# Patient Record
Sex: MALE | Race: WHITE | NOT HISPANIC OR LATINO | Employment: FULL TIME | URBAN - METROPOLITAN AREA
[De-identification: names, ages, dates, MRNs, and addresses within clinical notes are randomized per-mention and may not be internally consistent; named-entity substitution may affect disease eponyms.]

---

## 2023-08-04 DIAGNOSIS — I10 HYPERTENSION, UNSPECIFIED TYPE: ICD-10-CM

## 2023-08-04 RX ORDER — LOSARTAN POTASSIUM 50 MG/1
50 TABLET ORAL DAILY
COMMUNITY
End: 2023-08-04 | Stop reason: SDUPTHER

## 2023-08-04 RX ORDER — LOSARTAN POTASSIUM 50 MG/1
50 TABLET ORAL DAILY
Qty: 90 TABLET | Refills: 1 | Status: SHIPPED | OUTPATIENT
Start: 2023-08-04 | End: 2024-01-25 | Stop reason: SDUPTHER

## 2023-09-22 DIAGNOSIS — Z00.00 HEALTH MAINTENANCE EXAMINATION: ICD-10-CM

## 2023-09-25 DIAGNOSIS — Z00.00 HEALTH MAINTENANCE EXAMINATION: ICD-10-CM

## 2023-10-30 PROBLEM — N52.9 ERECTILE DYSFUNCTION: Status: ACTIVE | Noted: 2023-10-30

## 2023-10-30 PROBLEM — E78.5 HYPERLIPIDEMIA: Status: ACTIVE | Noted: 2023-10-30

## 2023-10-30 PROBLEM — H69.90 EUSTACHIAN TUBE DISORDER: Status: ACTIVE | Noted: 2023-10-30

## 2023-10-30 PROBLEM — M17.9 OSTEOARTHRITIS OF KNEE: Status: ACTIVE | Noted: 2023-10-30

## 2023-10-30 PROBLEM — R94.39 ABNORMAL STRESS TEST: Status: ACTIVE | Noted: 2023-10-30

## 2023-10-30 PROBLEM — R91.1 LUNG NODULE, SOLITARY: Status: ACTIVE | Noted: 2023-10-30

## 2023-10-30 PROBLEM — R00.2 PALPITATIONS: Status: ACTIVE | Noted: 2023-10-30

## 2023-10-30 PROBLEM — I83.812 VARICOSE VEINS OF LEFT LOWER EXTREMITY WITH PAIN: Status: ACTIVE | Noted: 2017-01-27

## 2023-10-30 PROBLEM — H90.12 CONDUCTIVE HEARING LOSS OF LEFT EAR WITH UNRESTRICTED HEARING OF RIGHT EAR: Status: ACTIVE | Noted: 2023-10-30

## 2023-10-30 PROBLEM — M75.102 ROTATOR CUFF TEAR, LEFT: Status: ACTIVE | Noted: 2023-10-30

## 2023-10-30 PROBLEM — R19.7 DIARRHEA: Status: ACTIVE | Noted: 2023-10-30

## 2023-10-30 PROBLEM — E55.9 VITAMIN D DEFICIENCY: Status: ACTIVE | Noted: 2023-10-30

## 2023-10-30 PROBLEM — I10 BENIGN ESSENTIAL HYPERTENSION: Status: ACTIVE | Noted: 2023-10-30

## 2023-10-30 PROBLEM — I83.819 VARICOSE VEINS OF LEG WITH PAIN: Status: ACTIVE | Noted: 2023-10-30

## 2023-10-30 RX ORDER — CYCLOBENZAPRINE HCL 5 MG
1 TABLET ORAL NIGHTLY
COMMUNITY

## 2023-10-30 RX ORDER — TADALAFIL 10 MG/1
1 TABLET ORAL DAILY PRN
COMMUNITY
Start: 2020-05-14 | End: 2023-10-31 | Stop reason: SDUPTHER

## 2023-10-30 RX ORDER — TRETINOIN 1 MG/G
CREAM TOPICAL
COMMUNITY

## 2023-10-30 RX ORDER — FLUTICASONE PROPIONATE 50 MCG
2 SPRAY, SUSPENSION (ML) NASAL DAILY
COMMUNITY
Start: 2022-08-10 | End: 2024-01-30 | Stop reason: SDUPTHER

## 2023-10-30 RX ORDER — MELOXICAM 15 MG/1
1 TABLET ORAL DAILY
COMMUNITY
End: 2023-10-31 | Stop reason: ALTCHOICE

## 2023-10-30 RX ORDER — SILDENAFIL 50 MG/1
1 TABLET, FILM COATED ORAL DAILY PRN
COMMUNITY
End: 2023-10-31 | Stop reason: SDUPTHER

## 2023-10-30 RX ORDER — MUPIROCIN 20 MG/G
OINTMENT TOPICAL
COMMUNITY
Start: 2022-06-30

## 2023-10-30 RX ORDER — LEVOCETIRIZINE DIHYDROCHLORIDE 5 MG/1
1 TABLET, FILM COATED ORAL DAILY
COMMUNITY
Start: 2022-08-01

## 2023-10-30 RX ORDER — ACETAMINOPHEN 500 MG
1 TABLET ORAL DAILY
COMMUNITY
Start: 2014-06-05

## 2023-10-30 RX ORDER — DIPHENOXYLATE HYDROCHLORIDE AND ATROPINE SULFATE 2.5; .025 MG/1; MG/1
1 TABLET ORAL 4 TIMES DAILY PRN
COMMUNITY
Start: 2022-01-19 | End: 2023-11-15 | Stop reason: ALTCHOICE

## 2023-10-30 RX ORDER — AZELASTINE HYDROCHLORIDE, FLUTICASONE PROPIONATE 137; 50 UG/1; UG/1
1 SPRAY, METERED NASAL 2 TIMES DAILY
COMMUNITY
Start: 2022-08-01

## 2023-10-30 RX ORDER — FLUTICASONE PROPIONATE AND SALMETEROL 100; 50 UG/1; UG/1
1 POWDER RESPIRATORY (INHALATION)
COMMUNITY
Start: 2023-01-07

## 2023-10-30 RX ORDER — MULTIVITAMIN
1 TABLET ORAL DAILY
COMMUNITY
Start: 2014-06-05

## 2023-10-30 RX ORDER — MULTIVIT-MIN/IRON/FOLIC ACID/K 18-600-40
1 CAPSULE ORAL DAILY
COMMUNITY
Start: 2014-06-05

## 2023-10-31 ENCOUNTER — NUTRITION (OUTPATIENT)
Dept: PRIMARY CARE | Facility: CLINIC | Age: 58
End: 2023-10-31
Payer: COMMERCIAL

## 2023-10-31 ENCOUNTER — HOSPITAL ENCOUNTER (OUTPATIENT)
Dept: RADIOLOGY | Facility: HOSPITAL | Age: 58
Discharge: HOME | End: 2023-10-31
Payer: COMMERCIAL

## 2023-10-31 ENCOUNTER — HOSPITAL ENCOUNTER (OUTPATIENT)
Dept: CARDIOLOGY | Facility: HOSPITAL | Age: 58
Discharge: HOME | End: 2023-10-31
Payer: COMMERCIAL

## 2023-10-31 ENCOUNTER — OFFICE VISIT (OUTPATIENT)
Dept: PRIMARY CARE | Facility: CLINIC | Age: 58
End: 2023-10-31

## 2023-10-31 ENCOUNTER — LAB (OUTPATIENT)
Dept: LAB | Facility: LAB | Age: 58
End: 2023-10-31
Payer: COMMERCIAL

## 2023-10-31 ENCOUNTER — APPOINTMENT (OUTPATIENT)
Dept: RADIOLOGY | Facility: CLINIC | Age: 58
End: 2023-10-31
Payer: COMMERCIAL

## 2023-10-31 ENCOUNTER — APPOINTMENT (OUTPATIENT)
Dept: INTEGRATIVE MEDICINE | Facility: CLINIC | Age: 58
End: 2023-10-31
Payer: COMMERCIAL

## 2023-10-31 VITALS
SYSTOLIC BLOOD PRESSURE: 128 MMHG | WEIGHT: 267 LBS | BODY MASS INDEX: 36.16 KG/M2 | HEIGHT: 72 IN | OXYGEN SATURATION: 97 % | HEART RATE: 56 BPM | DIASTOLIC BLOOD PRESSURE: 76 MMHG

## 2023-10-31 DIAGNOSIS — M54.50 LOW BACK PAIN, UNSPECIFIED BACK PAIN LATERALITY, UNSPECIFIED CHRONICITY, UNSPECIFIED WHETHER SCIATICA PRESENT: ICD-10-CM

## 2023-10-31 DIAGNOSIS — R94.31 ABNORMAL ELECTROCARDIOGRAM DURING EXERCISE STRESS TEST: Primary | ICD-10-CM

## 2023-10-31 DIAGNOSIS — Z00.00 HEALTH MAINTENANCE EXAMINATION: Primary | ICD-10-CM

## 2023-10-31 DIAGNOSIS — E78.9 LIPID DISORDER: ICD-10-CM

## 2023-10-31 DIAGNOSIS — R94.39 ABNORMAL STRESS TEST: Primary | ICD-10-CM

## 2023-10-31 DIAGNOSIS — R09.81 NASAL CONGESTION: ICD-10-CM

## 2023-10-31 DIAGNOSIS — Z00.00 HEALTH MAINTENANCE EXAMINATION: ICD-10-CM

## 2023-10-31 DIAGNOSIS — N52.9 ERECTILE DYSFUNCTION, UNSPECIFIED ERECTILE DYSFUNCTION TYPE: ICD-10-CM

## 2023-10-31 DIAGNOSIS — R94.39 ABNORMAL STRESS TEST: ICD-10-CM

## 2023-10-31 LAB
25(OH)D3 SERPL-MCNC: 46 NG/ML (ref 30–100)
ALBUMIN SERPL BCP-MCNC: 4.5 G/DL (ref 3.4–5)
ALP SERPL-CCNC: 47 U/L (ref 33–136)
ALT SERPL W P-5'-P-CCNC: 39 U/L (ref 10–52)
ANION GAP SERPL CALC-SCNC: 12 MMOL/L (ref 10–20)
AST SERPL W P-5'-P-CCNC: 25 U/L (ref 9–39)
BASOPHILS # BLD AUTO: 0.04 X10*3/UL (ref 0–0.1)
BASOPHILS NFR BLD AUTO: 1 %
BILIRUB SERPL-MCNC: 1.2 MG/DL (ref 0–1.2)
BUN SERPL-MCNC: 16 MG/DL (ref 6–23)
CALCIUM SERPL-MCNC: 8.8 MG/DL (ref 8.6–10.3)
CHLORIDE SERPL-SCNC: 104 MMOL/L (ref 98–107)
CHOLEST SERPL-MCNC: 212 MG/DL (ref 0–199)
CHOLESTEROL/HDL RATIO: 3.7
CO2 SERPL-SCNC: 25 MMOL/L (ref 21–32)
CREAT SERPL-MCNC: 0.79 MG/DL (ref 0.5–1.3)
CRP SERPL HS-MCNC: 1.8 MG/L
EOSINOPHIL # BLD AUTO: 0.15 X10*3/UL (ref 0–0.7)
EOSINOPHIL NFR BLD AUTO: 3.7 %
ERYTHROCYTE [DISTWIDTH] IN BLOOD BY AUTOMATED COUNT: 12.5 % (ref 11.5–14.5)
EST. AVERAGE GLUCOSE BLD GHB EST-MCNC: 94 MG/DL
FERRITIN SERPL-MCNC: 406 NG/ML (ref 20–300)
FOLATE SERPL-MCNC: >24 NG/ML
GFR SERPL CREATININE-BSD FRML MDRD: >90 ML/MIN/1.73M*2
GLUCOSE SERPL-MCNC: 89 MG/DL (ref 74–99)
HBA1C MFR BLD: 4.9 %
HCT VFR BLD AUTO: 42.9 % (ref 41–52)
HCYS SERPL-SCNC: 7.04 UMOL/L (ref 5–13.9)
HDLC SERPL-MCNC: 57.2 MG/DL
HGB BLD-MCNC: 14.8 G/DL (ref 13.5–17.5)
IMM GRANULOCYTES # BLD AUTO: 0 X10*3/UL (ref 0–0.7)
IMM GRANULOCYTES NFR BLD AUTO: 0 % (ref 0–0.9)
INSULIN P FAST SERPL-ACNC: 11 UIU/ML (ref 3–25)
IRON SATN MFR SERPL: 54 % (ref 25–45)
IRON SERPL-MCNC: 186 UG/DL (ref 35–150)
LDLC SERPL CALC-MCNC: 141 MG/DL
LYMPHOCYTES # BLD AUTO: 1.24 X10*3/UL (ref 1.2–4.8)
LYMPHOCYTES NFR BLD AUTO: 30.7 %
MAGNESIUM SERPL-MCNC: 2.2 MG/DL (ref 1.6–2.4)
MCH RBC QN AUTO: 30.6 PG (ref 26–34)
MCHC RBC AUTO-ENTMCNC: 34.5 G/DL (ref 32–36)
MCV RBC AUTO: 89 FL (ref 80–100)
MONOCYTES # BLD AUTO: 0.48 X10*3/UL (ref 0.1–1)
MONOCYTES NFR BLD AUTO: 11.9 %
NEUTROPHILS # BLD AUTO: 2.13 X10*3/UL (ref 1.2–7.7)
NEUTROPHILS NFR BLD AUTO: 52.7 %
NON HDL CHOLESTEROL: 155 MG/DL (ref 0–149)
NRBC BLD-RTO: 0 /100 WBCS (ref 0–0)
PLATELET # BLD AUTO: 161 X10*3/UL (ref 150–450)
PMV BLD AUTO: 11 FL (ref 7.5–11.5)
POC APPEARANCE, URINE: CLEAR
POC BILIRUBIN, URINE: NEGATIVE
POC BLOOD, URINE: NEGATIVE
POC COLOR, URINE: YELLOW
POC GLUCOSE, URINE: NEGATIVE MG/DL
POC KETONES, URINE: NEGATIVE MG/DL
POC LEUKOCYTES, URINE: NEGATIVE
POC NITRITE,URINE: NEGATIVE
POC PH, URINE: 7.5 PH
POC PROTEIN, URINE: NEGATIVE MG/DL
POC SPECIFIC GRAVITY, URINE: 1.01
POC UROBILINOGEN, URINE: 1 EU/DL
POTASSIUM SERPL-SCNC: 4.4 MMOL/L (ref 3.5–5.3)
PROT SERPL-MCNC: 7 G/DL (ref 6.4–8.2)
PSA SERPL-MCNC: 0.49 NG/ML
RBC # BLD AUTO: 4.83 X10*6/UL (ref 4.5–5.9)
SODIUM SERPL-SCNC: 137 MMOL/L (ref 136–145)
T3FREE SERPL-MCNC: 3.3 PG/ML (ref 2.3–4.2)
T4 FREE SERPL-MCNC: 0.81 NG/DL (ref 0.61–1.12)
TIBC SERPL-MCNC: 347 UG/DL (ref 240–445)
TRIGL SERPL-MCNC: 67 MG/DL (ref 0–149)
TSH SERPL-ACNC: 1.46 MIU/L (ref 0.44–3.98)
UIBC SERPL-MCNC: 161 UG/DL (ref 110–370)
URATE SERPL-MCNC: 6.4 MG/DL (ref 4–7.5)
VIT B12 SERPL-MCNC: 636 PG/ML (ref 211–911)
VLDL: 13 MG/DL (ref 0–40)
WBC # BLD AUTO: 4 X10*3/UL (ref 4.4–11.3)

## 2023-10-31 PROCEDURE — 36415 COLL VENOUS BLD VENIPUNCTURE: CPT

## 2023-10-31 PROCEDURE — 80053 COMPREHEN METABOLIC PANEL: CPT

## 2023-10-31 PROCEDURE — 81002 URINALYSIS NONAUTO W/O SCOPE: CPT | Performed by: INTERNAL MEDICINE

## 2023-10-31 PROCEDURE — 82746 ASSAY OF FOLIC ACID SERUM: CPT

## 2023-10-31 PROCEDURE — 82172 ASSAY OF APOLIPOPROTEIN: CPT

## 2023-10-31 PROCEDURE — 83550 IRON BINDING TEST: CPT

## 2023-10-31 PROCEDURE — 84439 ASSAY OF FREE THYROXINE: CPT

## 2023-10-31 PROCEDURE — 84550 ASSAY OF BLOOD/URIC ACID: CPT

## 2023-10-31 PROCEDURE — 93018 CV STRESS TEST I&R ONLY: CPT | Performed by: INTERNAL MEDICINE

## 2023-10-31 PROCEDURE — 83036 HEMOGLOBIN GLYCOSYLATED A1C: CPT

## 2023-10-31 PROCEDURE — 82728 ASSAY OF FERRITIN: CPT

## 2023-10-31 PROCEDURE — 72110 X-RAY EXAM L-2 SPINE 4/>VWS: CPT

## 2023-10-31 PROCEDURE — 83704 LIPOPROTEIN BLD QUAN PART: CPT

## 2023-10-31 PROCEDURE — 80061 LIPID PANEL: CPT

## 2023-10-31 PROCEDURE — 83540 ASSAY OF IRON: CPT

## 2023-10-31 PROCEDURE — EXAM4 EXAM 4: Performed by: INTERNAL MEDICINE

## 2023-10-31 PROCEDURE — 86141 C-REACTIVE PROTEIN HS: CPT

## 2023-10-31 PROCEDURE — 84153 ASSAY OF PSA TOTAL: CPT

## 2023-10-31 PROCEDURE — 83525 ASSAY OF INSULIN: CPT

## 2023-10-31 PROCEDURE — 93016 CV STRESS TEST SUPVJ ONLY: CPT | Performed by: INTERNAL MEDICINE

## 2023-10-31 PROCEDURE — 84402 ASSAY OF FREE TESTOSTERONE: CPT

## 2023-10-31 PROCEDURE — 72050 X-RAY EXAM NECK SPINE 4/5VWS: CPT | Performed by: RADIOLOGY

## 2023-10-31 PROCEDURE — NUTCO NUTRITION CONSULTATION: Performed by: DIETITIAN, REGISTERED

## 2023-10-31 PROCEDURE — 93017 CV STRESS TEST TRACING ONLY: CPT

## 2023-10-31 PROCEDURE — 71046 X-RAY EXAM CHEST 2 VIEWS: CPT | Performed by: RADIOLOGY

## 2023-10-31 PROCEDURE — 83735 ASSAY OF MAGNESIUM: CPT

## 2023-10-31 PROCEDURE — 3074F SYST BP LT 130 MM HG: CPT | Performed by: INTERNAL MEDICINE

## 2023-10-31 PROCEDURE — 82607 VITAMIN B-12: CPT

## 2023-10-31 PROCEDURE — 83695 ASSAY OF LIPOPROTEIN(A): CPT

## 2023-10-31 PROCEDURE — 72110 X-RAY EXAM L-2 SPINE 4/>VWS: CPT | Performed by: RADIOLOGY

## 2023-10-31 PROCEDURE — 84481 FREE ASSAY (FT-3): CPT

## 2023-10-31 PROCEDURE — 72050 X-RAY EXAM NECK SPINE 4/5VWS: CPT

## 2023-10-31 PROCEDURE — 3078F DIAST BP <80 MM HG: CPT | Performed by: INTERNAL MEDICINE

## 2023-10-31 PROCEDURE — 84443 ASSAY THYROID STIM HORMONE: CPT

## 2023-10-31 PROCEDURE — 71046 X-RAY EXAM CHEST 2 VIEWS: CPT | Mod: FY

## 2023-10-31 PROCEDURE — 85025 COMPLETE CBC W/AUTO DIFF WBC: CPT

## 2023-10-31 PROCEDURE — 82306 VITAMIN D 25 HYDROXY: CPT

## 2023-10-31 PROCEDURE — 83090 ASSAY OF HOMOCYSTEINE: CPT

## 2023-10-31 RX ORDER — SILDENAFIL 50 MG/1
50 TABLET, FILM COATED ORAL DAILY PRN
Qty: 30 TABLET | Refills: 1 | Status: SHIPPED | OUTPATIENT
Start: 2023-10-31

## 2023-10-31 RX ORDER — MELOXICAM 15 MG/1
15 TABLET ORAL DAILY
Qty: 30 TABLET | Refills: 1 | Status: SHIPPED | OUTPATIENT
Start: 2023-10-31 | End: 2023-12-30

## 2023-10-31 RX ORDER — ATORVASTATIN CALCIUM 20 MG/1
20 TABLET, FILM COATED ORAL DAILY
Qty: 90 TABLET | Refills: 3 | Status: SHIPPED | OUTPATIENT
Start: 2023-10-31 | End: 2024-01-25 | Stop reason: SDUPTHER

## 2023-10-31 RX ORDER — AZELASTINE HYDROCHLORIDE, FLUTICASONE PROPIONATE 137; 50 UG/1; UG/1
1 SPRAY, METERED NASAL 2 TIMES DAILY
Qty: 60 EACH | Refills: 1 | Status: SHIPPED | OUTPATIENT
Start: 2023-10-31 | End: 2024-03-07 | Stop reason: WASHOUT

## 2023-10-31 RX ORDER — TADALAFIL 10 MG/1
10 TABLET ORAL DAILY PRN
Qty: 30 TABLET | Refills: 1 | Status: SHIPPED | OUTPATIENT
Start: 2023-10-31 | End: 2023-12-30

## 2023-10-31 ASSESSMENT — ENCOUNTER SYMPTOMS
NEUROLOGICAL NEGATIVE: 1
RESPIRATORY NEGATIVE: 1
EYES NEGATIVE: 1
CARDIOVASCULAR NEGATIVE: 1
PSYCHIATRIC NEGATIVE: 1
CONSTITUTIONAL NEGATIVE: 1
GASTROINTESTINAL NEGATIVE: 1
ALLERGIC/IMMUNOLOGIC NEGATIVE: 1
ENDOCRINE NEGATIVE: 1
HEMATOLOGIC/LYMPHATIC NEGATIVE: 1

## 2023-10-31 ASSESSMENT — PAIN SCALES - GENERAL: PAINLEVEL: 0-NO PAIN

## 2023-10-31 NOTE — PROGRESS NOTES
Reason for Nutrition Visit:  It was a pleasure speaking with Mr. Oseguera again to discuss diet and nutrition as part of his executive physical follow-up.    Past Medical Hx:  Patient Active Problem List   Diagnosis    Abnormal stress test    Benign essential hypertension    Conductive hearing loss of left ear with unrestricted hearing of right ear    Diarrhea    Erectile dysfunction    Eustachian tube disorder    Hyperlipidemia    Lung nodule, solitary    Osteoarthritis of knee    Palpitations    Varicose veins of left lower extremity with pain    Varicose veins of leg with pain    Vitamin D deficiency    Rotator cuff tear, left        Weight change:  His current weight of 267lbs shows a 22lb weight gain since his previous physical (08.26.22, 245lbs).  His DBW is 235lbs.  Significant Weight Change: Yes    Lab Results   Component Value Date    HGBA1C 5.0 08/18/2022    CHOL 212 (H) 10/31/2023    LDLF 112 (H) 08/18/2022    TRIG 67 10/31/2023    HDL 57.2 10/31/2023        Food and Nutrition Hx:  He continues to work with [employer] for nutrition.  He continues to do intermittent fasting.  His first meal is around 10:30-11am and stops eating by 7pm.  He tries to follow a gluten-free and dairy-free diet.  He tries to focus on lean protein and vegetables at his meals.  His challenges are around traveling for work and having a lot of business dinners and on-the-road meals.  He is eating out 5-6 nights a week.  He eats out for breakfast 5-6 times a week and eats out everyday for lunch.  His wife spends a lot of time in [place] and he states that he is not a very good cook.   If he eats well during the day, he eats healthy at night, but if he goes too long without eating, he has a hard time controlling his food choices and portions.      He tries to make smoothies for breakfast and sometimes for dinner.  The past year has been more challenging with increased stress levels.  Last Saturday his son got  and he has made a  commitment to get healthier.      He is only eating 2 meals a day, lunch and dinner due to intermittent fasting.  He will snack on collagen bar or beef/turkey stick or nuts, usually 1-2 snacks in the afternoon.  He believes he needs the create better snacks in the afternoon to prevent getting overly hungry at night.  He does wear a CGM typically.      24 Diet Recall:  Breakfast: skipped due to intermittent fasting  Lunch (1:00pm): omelet (no cheese, eggs only, 3-4 eggs), potatoes  Snack (3:00pm): Bulletproof collagen bar  Dinner (5:30pm): grilled chicken (6-7oz), asparagus and mushrooms, 4 small breadsticks    Beverages: sparkling water-3 cans a day; water-1 can still; coffee-2-3 days a week, 1 cup decaf in the morning (black); alcohol-he does not drink during the week, a couple of glasses of wine on the weekend    Allergies: None  Intolerance: Gluten, Dairy    GI Symptoms : None He has a bowel movement everyday.    Exercise: [employer]-weight training 3 days a week, 20 minutes of high intensity; other days of the week he tries to do cardio, 2 days a week typically, mostly elliptical or Peloton bike, 20 minutes    Sleep duration/quality : 5-6 hours.  No issues falling asleep.  Occasionally he will wake up around 3am to use the bathroom (2 nights a week), falls back to sleep easily.    Supplements: Multivitamin, Vitamin C, and Vitamin D daily    Cravings: Sweet or popcorn in the evenings  Energy Levels: High    Estimated Energy Needs:    Weight Maintanence: 2887\ kcal/day ([place] [address] Jeor x 1.4 activity factor)  Weight Loss Needs: 6825-5402 kcal/day  Protein Needs: 140-180g/day (0.6-0.8g/lb DBW, 235lbs)    Nutrition Diagnosis:    Diagnosis Statement 1:  Diagnosis Status: New  Diagnosis : Unintended weight gain related to  increased energy intake  as evidenced by  frequent meals (75-90% of meals) eaten out/away from home, 22lb weight gain in the past year.    Diagnosis Statement 2:  Diagnosis Status:  New  Diagnosis : Inadequate protein intake  related to  food- and nutrition-related knowledge deficit regarding appropriate protein intake  as evidenced by  food recall showing he is not meeting the recommended 140-180g protein daily.    Nutrition Interventions:  Increased Fiber Diet and Increased Protein Diet  Nutrition Counseling: Goal Setting    Nutrition Goals:  Nutrition Goals : Decrease total cholesterol  Reduce LDL level  Adequate protein intake  Adequate fiber intake    Nutrition Recommendations:    1) Ensure you are getting adequate protein consistently throughout the day.  Your daily protein goal is 140-180g.  Since you are only eating 2 meals a day, try to include 50g protein (~6-7oz) and both lunch and dinner and try to have a mid-afternoon snack that provides at least 20-30g protein.    2) For your mid-afternoon snack, consider making homemade smoothies (especially when you have more time after you retire) using a 1-2 scoops of a protein powder provided ~40g protein. Include low glycemic fruits (I.e. berries), vegetables (leafy greens, frozen cauliflower, etc), and a source of healthy fat (avocado, natural nut butter, seeds-lisa, flax, hemp).  Another option is to have a pre-made protein drink such as OWYN Pro Elite that provides 32g protein along with a serving of fruit and/or a handful of nuts so that this is more like a mini meal.    3) Consider using pre-made meals from [employer], which are all gluten-free, dairy-free.  These are great to keep on hand for meals at work or in the evenings for a quick dinner.    4) You mentioned doing a 5 day Prolon fast to kick start weight loss.  After that consider a 6 week fat loss phase.  Recommended macronutrients based on a 2000 calorie meal plan: 100g carbohydrates (20% calories); 100g fat (50% calories); at least 150g protein (30% calories).  Make sure most of your fats are plant based-cooking oils (olive, avocado), olives, avocado, nuts, seeds, natural  nut butter.  Also, be sure to focus on fiber intake during the fat loss phase, taking in adequate non-starchy vegetables, 1 serving of low glycemic fruit each day, etc.  You may consider using a fiber supplement during this time.  This should only be done for 6 weeks, with a maintenance phase to follow.

## 2023-10-31 NOTE — PROGRESS NOTES
Subjective   Patient ID: German Oseguera is a 58 y.o. male who presents for Executive Health Examination  At the time of your evaluation you were feeling well with no major concerns.      HPI     Review of Systems   Constitutional: Negative.    HENT: Negative.     Eyes: Negative.    Respiratory: Negative.     Cardiovascular: Negative.    Gastrointestinal: Negative.    Endocrine: Negative.    Genitourinary: Negative.    Skin: Negative.    Allergic/Immunologic: Negative.    Neurological: Negative.    Hematological: Negative.    Psychiatric/Behavioral: Negative.     All other systems reviewed and are negative.      Neck pain/Lower back pain    Knee pain        Objective   /76 (BP Location: Left arm, Patient Position: Sitting, BP Cuff Size: Large adult)   Pulse 56   Ht 1.829 m (6')   Wt 121 kg (267 lb)   SpO2 97%   BMI 36.21 kg/m²     Physical Exam    No JVP elevation. No palpable Lymph Nodes. No Thyromegaly    HEENT- Negative    CVS-NL S1/S2 . No MRG    Lungs-CTA. B/S= B/L    Abdomen-Soft, Non-tender. No masses or HSM    No masses or hernia. Normal prostate     Extremities: No C/C/E    Skin-No atypical moles/rash    MSK- Neck tightness/Lower back discomfort  Full range of motion    Assessment/Plan     Cardiology- Normal EKG, CT-Cardiac score,  Carotid and Abdominal Aorta ultrasound studies.    Exercise stress test shows abnormal EKG pattern  consistent with possible  ischemic changes. Recommend Cardiology consult for further for further evaluation and management. ? CT Angiogram with heart flow studies.    Cancer screening- you are current with colonoscopy,prostate and lung cancer screening tests.    Elevated BMI- Please increase intake of fiber and proteins and limit processed/refined carbohydrates/sugars in your diet. Routine regular exercise is recommended. American Cardiology Association recommended 150 minutes weekly of aerobic exercise (exercise that increases your heart rate). In addition you should add  resistance training (lifting weights/etc.) for at least 30-60 minutes per week.      Neck pain/Lower back pain- ? Degenerative disc disease/mechanical. No evidence of Neuritis/Radiculitis. Start Meloxicam 15 mg daily/PRN for pain.    Knee arthralgia- Arthritis vs Ligamentous- Consider sport medicine/orthopedics consult with Dr Johnson/Dr Douglas. Consider a trial of Meloxicam 7.5/15 mg daily.Consider Massotherapy/Muscle release therapy consult.    R foot Parakeratosis- recommend DPM for further for further evaluation and management.      Vaccine-I would  recommend a shingles (Shingrix) vaccine. In addition, I would recommend a tetanus booster every 10 years to maintain immunity.  Recommend RSV at age 60 and  pneumonia vaccine (Prevnar 20) at age  65.  Consider COVID-19 booster and  flu shot this fall.    Wellness : Please continue with a balanced diet and a regular physical activity program for at least 150 minutes/week of moderate exercise and 30 minutes/week of resistance/weight training per week. Try to get 6 to 8 hours of sleep per night.  Please download the calm or [employer] marivel from the [employer] to assist with stress and sleep management if necessary.     Thank you for attending the  Executive Health Program. Please call me at [phone] should you have any questions/comments about your results.      MD Kat Prasad Master Clinician in Wellness    Senior Attending Physician , [facility]    [facility]    Clinical     [employer] [facility] [employer]    [place], [place]    This note was partially generated using the Dragon voice recognition system.  There may be some incorrect wording ,grammar, spelling or punctuation errors that were not corrected prior to committing the note to the medical record.     Addendum: CTA with heart flow study is scheduled for a.m .

## 2023-11-01 ENCOUNTER — HOSPITAL ENCOUNTER (OUTPATIENT)
Dept: RADIOLOGY | Facility: HOSPITAL | Age: 58
Discharge: HOME | End: 2023-11-01
Payer: COMMERCIAL

## 2023-11-01 VITALS — HEART RATE: 56 BPM | OXYGEN SATURATION: 100 % | DIASTOLIC BLOOD PRESSURE: 64 MMHG | SYSTOLIC BLOOD PRESSURE: 110 MMHG

## 2023-11-01 DIAGNOSIS — R93.1 ABNORMAL FINDINGS ON DIAGNOSTIC IMAGING OF HEART AND CORONARY CIRCULATION: ICD-10-CM

## 2023-11-01 DIAGNOSIS — R94.39 ABNORMAL STRESS TEST: ICD-10-CM

## 2023-11-01 PROCEDURE — 75574 CT ANGIO HRT W/3D IMAGE: CPT | Performed by: INTERNAL MEDICINE

## 2023-11-01 PROCEDURE — 2500000005 HC RX 250 GENERAL PHARMACY W/O HCPCS: Performed by: INTERNAL MEDICINE

## 2023-11-01 PROCEDURE — 2550000001 HC RX 255 CONTRASTS: Performed by: INTERNAL MEDICINE

## 2023-11-01 PROCEDURE — 0502T CT HEARTFLOW ANALYSIS: CPT

## 2023-11-01 PROCEDURE — 75574 CT ANGIO HRT W/3D IMAGE: CPT

## 2023-11-01 PROCEDURE — 2500000001 HC RX 250 WO HCPCS SELF ADMINISTERED DRUGS (ALT 637 FOR MEDICARE OP): Performed by: INTERNAL MEDICINE

## 2023-11-01 RX ORDER — METOPROLOL TARTRATE 1 MG/ML
5 INJECTION, SOLUTION INTRAVENOUS ONCE AS NEEDED
Status: DISCONTINUED | OUTPATIENT
Start: 2023-11-01 | End: 2023-11-02 | Stop reason: HOSPADM

## 2023-11-01 RX ORDER — METOPROLOL TARTRATE 100 MG/1
100 TABLET ORAL ONCE AS NEEDED
Status: DISCONTINUED | OUTPATIENT
Start: 2023-11-01 | End: 2023-11-02 | Stop reason: HOSPADM

## 2023-11-01 RX ORDER — METOPROLOL TARTRATE 1 MG/ML
5 INJECTION, SOLUTION INTRAVENOUS ONCE
Status: DISCONTINUED | OUTPATIENT
Start: 2023-11-01 | End: 2023-11-02 | Stop reason: HOSPADM

## 2023-11-01 RX ORDER — NITROGLYCERIN 0.4 MG/1
0.8 TABLET SUBLINGUAL ONCE
Status: COMPLETED | OUTPATIENT
Start: 2023-11-01 | End: 2023-11-01

## 2023-11-01 RX ORDER — METOPROLOL TARTRATE 100 MG/1
100 TABLET ORAL ONCE
Status: DISCONTINUED | OUTPATIENT
Start: 2023-11-01 | End: 2023-11-02 | Stop reason: HOSPADM

## 2023-11-01 RX ORDER — LORAZEPAM 2 MG/ML
0.5 INJECTION INTRAMUSCULAR EVERY 5 MIN PRN
Status: DISCONTINUED | OUTPATIENT
Start: 2023-11-01 | End: 2023-11-02 | Stop reason: HOSPADM

## 2023-11-01 RX ORDER — METOPROLOL TARTRATE 1 MG/ML
5 INJECTION, SOLUTION INTRAVENOUS ONCE AS NEEDED
Status: COMPLETED | OUTPATIENT
Start: 2023-11-01 | End: 2023-11-01

## 2023-11-01 RX ADMIN — METOROPROLOL TARTRATE 5 MG: 5 INJECTION, SOLUTION INTRAVENOUS at 09:04

## 2023-11-01 RX ADMIN — IOHEXOL 90 ML: 350 INJECTION, SOLUTION INTRAVENOUS at 09:49

## 2023-11-01 RX ADMIN — NITROGLYCERIN 0.8 MG: 0.4 TABLET SUBLINGUAL at 09:09

## 2023-11-01 ASSESSMENT — PAIN - FUNCTIONAL ASSESSMENT: PAIN_FUNCTIONAL_ASSESSMENT: 0-10

## 2023-11-02 LAB
APO A-I SERPL-MCNC: 166 MG/DL (ref 104–202)
APO B/APO A-I SERPL: 0.7 {RATIO}
APO B100 SERPL-MCNC: 114 MG/DL (ref 66–133)
APOLIPOPROTEIN A1: 166
APOLIPOPROTEIN B: 114
CHOLEST SERPL-MCNC: 220 MG/DL (ref 100–199)
HDL SERPL-SCNC: 34.6 UMOL/L
HDLC SERPL-MCNC: 64 MG/DL
LDL SERPL QN: 21.2 NM
LDL SERPL-SCNC: 1569 NMOL/L
LDL SMALL SERPL-SCNC: 328 NMOL/L
LDLC SERPL CALC-MCNC: 143 MG/DL (ref 0–99)
LP-IR SCORE SERPL: <25
LPA SERPL-MCNC: <6 MG/DL
TRIGL SERPL-MCNC: 73 MG/DL (ref 0–149)

## 2023-11-03 LAB — MAGNESIUM RBC-MCNC: 5.9 MG/DL (ref 3.6–7.5)

## 2023-11-04 LAB
TESTOSTERONE FREE (CHAN): 59.8 PG/ML (ref 35–155)
TESTOSTERONE,TOTAL,LC-MS/MS: 396 NG/DL (ref 250–1100)

## 2023-11-15 ENCOUNTER — OFFICE VISIT (OUTPATIENT)
Dept: CARDIOLOGY | Facility: HOSPITAL | Age: 58
End: 2023-11-15
Payer: COMMERCIAL

## 2023-11-15 ENCOUNTER — PHARMACY VISIT (OUTPATIENT)
Dept: PHARMACY | Facility: CLINIC | Age: 58
End: 2023-11-15
Payer: MEDICARE

## 2023-11-15 VITALS
OXYGEN SATURATION: 96 % | RESPIRATION RATE: 18 BRPM | DIASTOLIC BLOOD PRESSURE: 88 MMHG | WEIGHT: 255 LBS | HEART RATE: 64 BPM | SYSTOLIC BLOOD PRESSURE: 149 MMHG | HEIGHT: 72 IN | BODY MASS INDEX: 34.54 KG/M2

## 2023-11-15 DIAGNOSIS — E78.2 MIXED HYPERLIPIDEMIA: ICD-10-CM

## 2023-11-15 DIAGNOSIS — I10 PRIMARY HYPERTENSION: Primary | ICD-10-CM

## 2023-11-15 DIAGNOSIS — K76.0 HEPATIC STEATOSIS: ICD-10-CM

## 2023-11-15 DIAGNOSIS — E66.9 CLASS 2 OBESITY: ICD-10-CM

## 2023-11-15 PROCEDURE — 1036F TOBACCO NON-USER: CPT | Performed by: INTERNAL MEDICINE

## 2023-11-15 PROCEDURE — 3077F SYST BP >= 140 MM HG: CPT | Performed by: INTERNAL MEDICINE

## 2023-11-15 PROCEDURE — 99205 OFFICE O/P NEW HI 60 MIN: CPT | Performed by: INTERNAL MEDICINE

## 2023-11-15 PROCEDURE — 3079F DIAST BP 80-89 MM HG: CPT | Performed by: INTERNAL MEDICINE

## 2023-11-15 PROCEDURE — 97802 MEDICAL NUTRITION INDIV IN: CPT | Mod: 59 | Performed by: INTERNAL MEDICINE

## 2023-11-15 PROCEDURE — RXMED WILLOW AMBULATORY MEDICATION CHARGE

## 2023-11-15 PROCEDURE — 99215 OFFICE O/P EST HI 40 MIN: CPT | Performed by: INTERNAL MEDICINE

## 2023-11-15 RX ORDER — GLUCOSAMINE/CHONDR SU A SOD 750-600 MG
2 TABLET ORAL DAILY
COMMUNITY
Start: 2009-12-18

## 2023-11-15 RX ORDER — ZOLPIDEM TARTRATE 10 MG/1
TABLET ORAL
COMMUNITY
Start: 2009-12-18

## 2023-11-15 RX ORDER — IRBESARTAN 75 MG/1
1 TABLET ORAL DAILY
COMMUNITY
Start: 2009-12-18 | End: 2023-11-15 | Stop reason: ALTCHOICE

## 2023-11-15 RX ORDER — CELECOXIB 200 MG/1
1 CAPSULE ORAL DAILY
COMMUNITY
Start: 2010-02-08 | End: 2023-11-15 | Stop reason: ALTCHOICE

## 2023-11-15 ASSESSMENT — PATIENT HEALTH QUESTIONNAIRE - PHQ9
2. FEELING DOWN, DEPRESSED OR HOPELESS: NOT AT ALL
1. LITTLE INTEREST OR PLEASURE IN DOING THINGS: NOT AT ALL
SUM OF ALL RESPONSES TO PHQ9 QUESTIONS 1 AND 2: 0

## 2023-11-15 ASSESSMENT — ENCOUNTER SYMPTOMS
LOSS OF SENSATION IN FEET: 0
DEPRESSION: 0
OCCASIONAL FEELINGS OF UNSTEADINESS: 0

## 2023-11-15 ASSESSMENT — PAIN SCALES - GENERAL: PAINLEVEL: 0-NO PAIN

## 2023-11-15 NOTE — PATIENT INSTRUCTIONS
There are several ways to reduce your risk for heart disease and stroke through lifestyle measures.  These include changes to your diet to reduce salt, increase consumption of fruits and vegetables, choose whole grains such as whole-wheat, choose low fat dairy products and avoid saturated and transfats, reduce sugar intake and avoid snacks and sweets, and choose lean meats over high cholesterol fatty meat.  It is also recommended to increase physical activity such as brisk walking, jogging, swimming, or bicycling for at least 150 minutes/week.  This can be divided up over 30-minute periods 5 times per week.  It is also recommended that you try to get 8 hours of sleep per night.     GLP1

## 2023-11-15 NOTE — PROGRESS NOTES
[facility] ([facility])      Reason for Visit: No chief complaint on file. and FOLLOW UP  VISIT [facility]    PROBLEM LIST  Problem List Items Addressed This Visit          Cardiac and Vasculature    Hyperlipidemia     Other Visit Diagnoses       Primary hypertension    -  Primary    Class 2 obesity        Hepatic steatosis                 Referring Clinician: No ref. provider found     History of Present Illness: German Oseguera is a 58 y.o. male who presents for a follow-up evaluation in the [facility] program.  He underwent a stress test on 10/31/2023 that was abnormal.  His resting EKG showed sinus bradycardia with first-degree AV block and right axis deviation.  He exercise for a total duration of 12 minutes with a peak heart rate of 144 bpm which is 90% of his age-predicted maximum.  His stress EKG showed sinus tachycardia with PVCs and PACs with a 2 mm downsloping depression during the stress phase.  Peak blood pressures recorded were approximately 160/70.  He accomplished a total of 12.5 METS.  Based on this he underwent a CT angiogram that revealed minor luminal irregularities in his LAD system.  The most distal portion of the LAD demonstrated abnormal FFR but this is likely an artifact.  Review of his laboratory results over the last    Review of his carotid duplex from several years ago documented carotid intimal thickening.  On his recent coronary CT he was noted to have hepatic steatosis although he has no concomitant abnormalities in LFTs including changes in his albumin etc. his hepatic fibrosis score is therefore within normal limit.    Mr. Judy palm is a busy man with a hectic schedule that requires travel around the world and different time zones.  Despite this, he tries to do his part in terms of maintaining healthy dose of physical activity which he accomplishes using a combination of weightlifting and aerobic exercise.  During the workout days consumes protein  rich diets and also participates in intermittent fasting when he can between 730 to 11 AM on the days other than his workouts.    He is completely asymptomatic.  Hedenies dyspnea on exertion, angina pectoris, orthopnea or PND.        CTA Coronaries  1. Minimal luminal irregularities noted in the proximal to mid LAD  and proximal RCA.  2. No evidence of obstructive coronary stenosis.  3. Coronary calcium score of 0.3.  4. CT scan was sent for the duration of noninvasive fractional flow  reserve using Heart Flow technology. These results will be updated  upon receipt of the FFR results.  5. Diffuse low-attenuation attenuation of the liver that is  consistent with mild hepatic steatosis.    Carotid USG    Right Carotid: Findings are consistent with less than 50% stenosis of the right proximal ICA. Laminar flow seen by color Doppler.  Left Carotid: Findings are consistent with less than 50% stenosis of the left proximal ICA. Laminar flow seen by color Doppler.  Comparison:  Compared with study from 6/19/2020, no significant change.  Additional Findings:     Imaging & Doppler Findings:  Right Plaque Morph: No plaque identified in the right carotid artery.  Left Plaque Morph: No plaque identified in the left carotid artery.  Intimal thickening noted in 2017   Right                 Left    PSV     EDV           PSV     EDV  63 cm/s 12 cm/s ICA P 61 cm/s 10 cm/s      CMR 2014  1. The left ventricle is normal in size, shape, and has normal global   systolic function.  There are no segmental wall motion abnormalities.    Quantitative values are as noted above.  2. There are no findings to suggest prior ischemic damage or an   infiltrative process.  3. Normal aortic, mitral, and tricuspid valve function.  4. Ascending aorta at the upper limit of normal measuring 3.8 cm. The   rest of the thoracic aorta appears normal in course, caliber, and   contour.   Past Medical History:  He has a past medical history of Acute upper  respiratory infection, unspecified (03/26/2019), Nontoxic single thyroid nodule (09/07/2017), Pain in left foot (01/03/2017), Personal history of other diseases of the musculoskeletal system and connective tissue (08/01/2013), Personal history of other diseases of the respiratory system (08/24/2015), Right upper quadrant abdominal tenderness (08/26/2016), and Ventricular premature depolarization (03/16/2016).    Past Surgical History:  He has a past surgical history that includes Vasectomy (09/24/2013); Knee arthroscopy w/ debridement (09/24/2013); Refractive surgery (08/29/2017); Other surgical history (06/21/2020); Ablation of dysrhythmic focus (09/16/2017); and CT angio coronary art with heartflow if score >30% (11/1/2023).    Social History:  He reports that he has never smoked. He does not have any smokeless tobacco history on file.  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    He does not smoke and is a social drinker.    Outpatient Medications:  Current Outpatient Medications   Medication Instructions    ascorbic acid, vitamin C, 500 mg capsule 1 capsule, oral, Daily    atorvastatin (LIPITOR) 20 mg, oral, Daily    azelastine-fluticasone (Dymista) 137-50 mcg/spray nasal spray 1 spray, Each Nostril, 2 times daily    azelastine-fluticasone (Dymista) 137-50 mcg/spray nasal spray 1 spray, Each Nostril, 2 times daily, Use in each nostril as directed    celecoxib (CeleBREX) 200 mg capsule 1 capsule, oral, Daily    cholecalciferol (Vitamin D-3) 50 mcg (2,000 unit) capsule 1 capsule, oral, Daily    cyclobenzaprine (Flexeril) 5 mg tablet 1 tablet, oral, Nightly    diphenoxylate-atropine (Lomotil) 2.5-0.025 mg tablet 1 tablet, oral, 4 times daily PRN    fluticasone (Flonase) 50 mcg/actuation nasal spray 2 sprays, Each Nostril, Daily    glucosamine/chondr appiah A sod (glucosamine-chondroitin) 750-600 mg tablet tablet 2 tablets, oral, Daily    irbesartan  (Avapro) 75 mg tablet 1 tablet, oral, Daily    levocetirizine (Xyzal) 5 mg tablet 1 tablet, oral, Daily    losartan (COZAAR) 50 mg, oral, Daily    LYCOPENE ORAL Take one(1) tablet daily.    meloxicam (MOBIC) 15 mg, oral, Daily    multivitamin tablet 1 tablet, oral, Daily    mupirocin (Bactroban) 2 % ointment Topical    sildenafil (VIAGRA) 50 mg, oral, Daily PRN    tadalafil (CIALIS) 10 mg, oral, Daily PRN    tretinoin (Retin-A) 0.1 % cream Topical    Wixela Inhub 100-50 mcg/dose diskus inhaler 1 puff, inhalation, 2 times daily RT, For 14 days    zolpidem (Ambien) 10 mg tablet oral       Last Recorded Vitals:  There were no vitals filed for this visit.  There is no height or weight on file to calculate BMI.     Physical Examination:  General: Well appearing, well-nourished, in no acute distress.  He does have evidence of mild visceral adiposity.  HEENT: Normocephalic atraumatic, pupils equal and reactive to light, extraocular muscles intact, no conjunctival injection, oropharynx clear without exudates.  Neck: Normal carotid arterial pulses, no arterial bruits, no thyromegaly.  Cardiac: Regular rhythm and normal heart rate.  S1, S2 present and normal.  No murmurs, rubs, or gallops.   Pulmonary: No increased work of breathing, no wheezes or crackles.  GI: Normal bowel sounds, no organomegaly appreciated;  no abdominal bruits.      Laboratory Studies:  Lab Results   Component Value Date    GLUCOSE 89 10/31/2023    CALCIUM 8.8 10/31/2023     10/31/2023    K 4.4 10/31/2023    CO2 25 10/31/2023     10/31/2023    BUN 16 10/31/2023    CREATININE 0.79 10/31/2023     Lab Results   Component Value Date    CHOL 212 (H) 10/31/2023    CHOL 178 08/18/2022    CHOL 164 02/23/2022     Lab Results   Component Value Date    HDL 57.2 10/31/2023    HDL 55.0 08/18/2022    HDL 53.0 02/23/2022     Lab Results   Component Value Date    LDLCALC 141 (H) 10/31/2023     Lab Results   Component Value Date    TRIG 67 10/31/2023    TRIG  "54 08/18/2022    TRIG 46 02/23/2022     No components found for: \"CHOLHDL\"  Lab Results   Component Value Date    HGBA1C 4.9 10/31/2023    HGBA1C 5.0 08/18/2022    HGBA1C 4.9 02/23/2022     Lab Results   Component Value Date    GLUF 84 02/23/2022    LDLCALC 141 (H) 10/31/2023    CREATININE 0.79 10/31/2023        Cardiology Tests:    Echo:  No results found for this or any previous visit from the past 1095 days.    Stress Test:  Stress Test 11/6/2023    Cardiac Imaging:  CT angio coronary art with heartflow if score >30% 11/1/2023        Assessment and Plan:  German Oseguera is a 58 y.o. male who presents for initial evaluation in the [facility].  He recently underwent a CT coronary angiogram that revealed minor luminal irregularities with a coronary artery calcium score of 0.3.  Previous evaluation that revealed a CAC score of 0.  Hepatic steatosis on CT.  He was also noted to have on evaluation today in clinic his blood pressure was 149/88 with a BMI of 34.58.  There were no focal cardiovascular signs on physical examination.  Review of his lab reveal an LDL cholesterol of 141 with a non-HDL cholesterol of around 150.  Hemoglobin A1c is 4.9 with average glucose of 94 on his continuous glucose monitor.  NMR lipoprotein profile reveals an LDL particles of 1569 which is elevated.  Small LDL particles were within normal limits at 328.  LP(a) homocystine values were within normal limits.    In summary this is a 58-year-old patient with evidence of visceral adiposity and some components of insulin resistance although his hemoglobin A1c is within normal limits.  We do not have complete details on his CGM such as his postprandial glucoses however his glucose control appears to be surprisingly robust.  He does have elevated LDL cholesterol and a non-HDL components with elevated LDL particles.    We discussed a visceral adiposity centric approach and he is willing to move ahead with a diet and lifestyle change aided in " embedded with a GLP-1 receptor antagonist strategy that will help him lower his weight and accomplish metabolic reset.    He was given detailed instructions on the usage of GLP-1 and also underwent a thorough dietary validation and education with the [facility] diabetes educator.    PLAN.  Visceral Adiposity.  Initiate treatment with his tirzepatide.  Our goal is approximately 15% of body weight loss which would put him around 102 kg.  Hypertension.  He does have elevated clinic systolic blood pressures.  We will need careful monitoring of his home blood pressures with the intention to switch over to a different regimen that provides better 24-hour control which may include a long-acting ARB plus or minus a CCB.  We will obtain a cardiac MRI in 3 to 4 months to assess LV hypertrophy and fibrosis.  His goal blood pressures are systolic blood pressure less than 130 mmHg.  Lifestyle modification with continuation of intermittent fasting regimens.  Follow-up in 3 months by televisit.        Education: Reviewed ‘ABCs’ of diabetes management (respective goals in parentheses):  A1C (<6), blood pressure (<130/80), and cholesterol (LDL <70).  Compliance at present is estimated to be excellent.   Follow up: 3 month      Abdullahi Ca MD, FACC, FANEEMA.  Chief, Cardiovascular Medicine  [facility]  Chief Academic and Scientific Officer  Lorenzo Ramirez Professor of Medicine  Professor of Medicine, Radiology and Biomedical Engineering  [employer]  [place], [place]

## 2023-11-16 NOTE — PROGRESS NOTES
[facility] - MEDICAL NUTRITION THERAPY     59 yo male is here today for nutrition counseling and support for obesity, BMI 34.58,  HTN, and hyperlipidemia.   Social History:  [employer], describes stress levels to be extremely high. Travels frequently world wide, works 7 days per week. Eats most meals (lunch and dinner) at restaurants.   *PT is seeing a functional RD. He is taking supplements, but unsure of which ones. Wears CGM (levels [place] with [employer]).    Weight history: pt tends to fluctuate between 230-260.   Current cardiometabolic meds include: Just started lipitor 20mg three weeks ago. Losartan 50mg.    significant labs:     Diet: trys to adhere to low carb, will eat protein and vegetables at restaurants, but then feels hungry in evening and may over snack. Intermittent fasts 2-3 days per week for 16 hours. Admits to loving abdullahi.     Exercise: lifts weight 3 days per week. Has eliptical and peloton but is not a consistent with that.    SMBG: A1c 4.9%. Aims to keep his blood glucose <120 after meals.    Pt reports to sleep well. He is retiring at the end of this year to focus on his health and spend more time with family and friends.     Education/plan:  Reviewed [facility] welcome folder.   Encouraged pt to focus more on plant based proteins to decrease saturated fat intake and increase fiber. Increase fiber throughout the day by incorporating more whole grains, beans, lentils, nuts and seeds in diet. Fiber goal >25g/day.  Reviewed sodium goals for HTN, <1500mg/d   Continue exercise regimen  Reviewed benefits of GLP1-RA for weight loss    I will follow up with patient in 1 week with a phone call.     20 minutes spent face to face.   Marcy Clinton RD, LD, CDE

## 2023-11-20 ENCOUNTER — TELEMEDICINE (OUTPATIENT)
Dept: PHARMACY | Facility: HOSPITAL | Age: 58
End: 2023-11-20
Payer: COMMERCIAL

## 2023-11-20 DIAGNOSIS — E66.9 CLASS 2 OBESITY: ICD-10-CM

## 2023-11-20 NOTE — PATIENT INSTRUCTIONS
Education for the administration of once-weekly Mounjaro:    1. Instructed patient that Mounjaro must be kept refrigerated, and if necessary a pen may be stored unrefrigerated at temperatures not to exceed 30C (86F) for up to 21 days.   2. Remove the Pen from the refrigerator. Leave the base cap on until you are ready to inject.  3. Check the Pen label to make sure you have the right medicine and it has not . Additionally, ensure that the solution is not cloudy, discolored, or has particles in it.  4. Prior to administration, wash and rinse hands.   5. Next, choose your injection site (You may inject into your abdomen or thigh; another person may give you the injection in your upper arm).  6. Change (rotate) your injection site each week. You may use the same area of your body, but be sure to choose a different injection site in that area.  7. Once administration site has been selected, use a new alcohol swab to sanitize the administration site and allow to air dry.  8. First, make sure the pen is in the locked position. While still in the locked position remove the base cap (gray cap) and dispose into a regular trash. Do not attempt to put the base cap back on, this may damage the needle.  9. Place the Clear Base flat and firmly against your skin at the injection site.   10. Press and hold the purple injection button. You will hear a loud click. Continue holding the Clear Base firmly against your skin until you hear a second click. Do not rub the area after administration.  11. Dispose of the pen in a sharps container (i.e. Coffee can, laundry detergent bottle)  12. Injections should be done on the same day every week, if you forget you have up to 4 days (96 hours) to remember to do your next dose.      Sp Willis, PharmD  [phone]

## 2023-11-20 NOTE — PROGRESS NOTES
Patient is sent at the request of Abdullahi Ca MD for medication management.  My final recommendations will be communicated back to the requesting provider by way of shared medical record.    Subjective     German Oseguera is a 58 y.o. year old male patient with class I obesity (BMI 34.58 kg/m2),  HTN, and hyperlipidemia. He is referred for medication assisted weight loss management with the initiation of Mounjaro 2.5 mg. He does not have a history that is significant for prediabetes or type two diabetes mellitus.       No Known Allergies    Intermittent fasts 2-3 days per week for 16 hours. Admits to kalyan abdullahi.      Exercise: lifts weight 3 days per week. Has eliptical and peloton but is not a consistent with that.    Cardiovascular risk factors include advanced age (older than 55 for men, 65 for women), dyslipidemia, male gender, and obesity (BMI >= 30 kg/m2). The patient is on an ACE inhibitor or angiotensin II receptor blocker.      Adverse Effects:   None as of 11/20/2023     Objective     There were no vitals taken for this visit.    Weight Loss Pharmacotherapy:    Mounjaro 2.5 mg: Inject 2.5 mg under the skin once weekly   (Therapy prescribed on        Secondary Prevention:   - Statin? Yes - Atorvastatin 20 mg   - ACE-I/ARB? Yes - Losartan 50 mg daily   - Aspirin? No    Pertinent PMH Review:  - PMH of Pancreatitis: No  - PMH of Retinopathy: No  - PMH of Urinary Tract Infections: No  - PMH of MTC/MEN Type II: No    Labs:   Lab Results   Component Value Date    BILITOT 1.2 10/31/2023    CALCIUM 8.8 10/31/2023    CO2 25 10/31/2023     10/31/2023    CREATININE 0.79 10/31/2023    GLUCOSE 89 10/31/2023    ALKPHOS 47 10/31/2023    K 4.4 10/31/2023    PROT 7.0 10/31/2023     10/31/2023    AST 25 10/31/2023    ALT 39 10/31/2023    BUN 16 10/31/2023    ANIONGAP 12 10/31/2023    MG 2.20 10/31/2023    ALBUMIN 4.5 10/31/2023    GFRMALE >90 08/18/2022     Lab Results   Component Value Date    TRIG 67  10/31/2023    CHOL 212 (H) 10/31/2023    LDLCALC 141 (H) 10/31/2023    HDL 57.2 10/31/2023     Component      Latest Ref Rng 10/31/2023   LDL Calculated      <=99 mg/dL 141 (H)       Component      Latest Ref Rng 10/31/2023   Lipoprotein (a)      <=29 mg/dL <6      Lab Results   Component Value Date    HGBA1C 4.9 10/31/2023    HGBA1C 5.0 08/18/2022    HGBA1C 4.9 02/23/2022     The 10-year ASCVD risk score (Jarod SMITH, et al., 2019) is: 12.4%    Values used to calculate the score:      Age: 58 years      Sex: Male      Is Non- : No      Diabetic: No      Tobacco smoker: No      Systolic Blood Pressure: 149 mmHg      Is BP treated: Yes      HDL Cholesterol: 57.2 mg/dL      Total Cholesterol: 212 mg/dL    Health Maintenance:   Lipid Panel: 141 mg/dL (10/31/2023) - Recently started on moderate intensity statin therapy as of 10/31/2023    Assessment/Plan   Problem List Items Addressed This Visit    None  Visit Diagnoses       Class 2 obesity              Initiate:   Mounjaro 2.5 mg: Inject 2.5 mg under the skin once weekly   Patient notes that he will complete his first injection on 11/25/2023  Advised to contact me upon receipt of his doses to review medication administration  Discussed with patient the off-label use of once-weekly Mounjaro which is indicated and approved by the FDA for Type Two Diabetes Mellitus treatment. Medication cost and coverage for this agent may change at any time as determined by your primary insurance carrier.   Discussed risks of GLP1ra including risk of pancreatitis, MTC and worsening of pre-existing DR  His current cost for his Mounjaro is $0 with insurance providing 100% coverage.   Education Provided to Patient: See AVS   START Mounjaro 2.5 mg under the skin once weekly, plan to increase dose according to standard titration plan pending patient tolerability and therapeutic response.  Option to stop titration at any point and continue as maintenance dose.  We  discussed the likelihodd of titrating to 7.5 mg or 10 mg as his maintenance therapy to hope to achieve up to 15% baseline body weight loss in conjunction with diet and exercise.   Follow-up: I recommend diabetes care be  12/11/2023 .  [facility] Follow-Up: Not scheduled     Sp Willis, PharmD    Continue all meds under the continuation of care with the referring provider and clinical pharmacy team.

## 2023-11-22 ENCOUNTER — TELEPHONE (OUTPATIENT)
Dept: CARDIOLOGY | Facility: HOSPITAL | Age: 58
End: 2023-11-22
Payer: COMMERCIAL

## 2023-11-22 NOTE — TELEPHONE ENCOUNTER
"[facility] follow up note    Called pt for follow up. He is doing well. After our meeting he has exercised every day and adhered to a plant based diet for the most part with the exception of salmon and shrimp a few times. He does try to stay away from gluten and dairy as he found those to cause inflammation/arthritic pain.     He received mounjaro, plan is to start on 11/25. Reviewed strategies on how to \"deal\" with nausea or other unwanted side effects. Mostly eating slower, smaller,  lower fat meals.    I will call patient in 1 month for follow up.     Marcy Clinton RD, [facility]     "

## 2023-12-01 ENCOUNTER — APPOINTMENT (OUTPATIENT)
Dept: CARDIOLOGY | Facility: CLINIC | Age: 58
End: 2023-12-01
Payer: COMMERCIAL

## 2023-12-01 ENCOUNTER — TELEPHONE (OUTPATIENT)
Dept: PRIMARY CARE | Facility: CLINIC | Age: 58
End: 2023-12-01
Payer: COMMERCIAL

## 2023-12-03 DIAGNOSIS — E78.9 LIPID DISORDER: ICD-10-CM

## 2023-12-03 DIAGNOSIS — Z00.00 ANNUAL PHYSICAL EXAM: Primary | ICD-10-CM

## 2023-12-06 ENCOUNTER — LAB (OUTPATIENT)
Dept: LAB | Facility: LAB | Age: 58
End: 2023-12-06
Payer: COMMERCIAL

## 2023-12-06 DIAGNOSIS — E78.9 LIPID DISORDER: ICD-10-CM

## 2023-12-06 LAB
ALT SERPL W P-5'-P-CCNC: 25 U/L (ref 10–52)
AST SERPL W P-5'-P-CCNC: 22 U/L (ref 9–39)
CHOLEST SERPL-MCNC: 102 MG/DL (ref 0–199)
CHOLESTEROL/HDL RATIO: 2.2
CK SERPL-CCNC: 184 U/L (ref 0–325)
HDLC SERPL-MCNC: 46.7 MG/DL
LDLC SERPL CALC-MCNC: 47 MG/DL
NON HDL CHOLESTEROL: 55 MG/DL (ref 0–149)
TRIGL SERPL-MCNC: 43 MG/DL (ref 0–149)
VLDL: 9 MG/DL (ref 0–40)

## 2023-12-06 PROCEDURE — 82550 ASSAY OF CK (CPK): CPT

## 2023-12-06 PROCEDURE — 80061 LIPID PANEL: CPT

## 2023-12-06 PROCEDURE — 36415 COLL VENOUS BLD VENIPUNCTURE: CPT

## 2023-12-06 PROCEDURE — 84460 ALANINE AMINO (ALT) (SGPT): CPT

## 2023-12-06 PROCEDURE — 84450 TRANSFERASE (AST) (SGOT): CPT

## 2023-12-11 ENCOUNTER — TELEMEDICINE (OUTPATIENT)
Dept: PHARMACY | Facility: HOSPITAL | Age: 58
End: 2023-12-11
Payer: COMMERCIAL

## 2023-12-11 DIAGNOSIS — E66.9 CLASS 2 OBESITY: Primary | ICD-10-CM

## 2023-12-11 PROCEDURE — RXMED WILLOW AMBULATORY MEDICATION CHARGE

## 2023-12-11 RX ORDER — TIRZEPATIDE 5 MG/.5ML
5 INJECTION, SOLUTION SUBCUTANEOUS
Qty: 2 ML | Refills: 2 | Status: SHIPPED | OUTPATIENT
Start: 2023-12-11 | End: 2023-12-29 | Stop reason: ALTCHOICE

## 2023-12-11 NOTE — PROGRESS NOTES
Patient is sent at the request of Abdullahi Ca MD for medication management.  My final recommendations will be communicated back to the requesting provider by way of shared medical record.    Subjective     German Oseguera is a 58 y.o. year old male patient with class I obesity (BMI 34.58 kg/m2),  HTN, and hyperlipidemia. He is referred for medication assisted weight loss management with the initiation of Mounjaro 2.5 mg. He does not have a history that is significant for prediabetes or type two diabetes mellitus. Today we are following up after completion of his first 3 injections with Mounjaro 2.5 mg to review tolerability and efficacy of the medication to date.       No Known Allergies    Notes improved satiety with meals and decreased hunger/cravings since Mounjaro initiation. Does not endorse any GI complaints. Overall feels good with medication start. Stated that completing his intermittent fasting has been easier.     Intermittent fasts 2-3 days per week for 16 hours. Admits to loving abdullahi.      Exercise: lifts weight 3 days per week. Has eliptical and peloton but is not a consistent with that.    Cardiovascular risk factors include advanced age (older than 55 for men, 65 for women), dyslipidemia, male gender, and obesity (BMI >= 30 kg/m2). The patient is on an ACE inhibitor or angiotensin II receptor blocker.      Adverse Effects:   No noted adverse events reported with Mounjaro initiation     Weight:   11/15/2023 - 255 lb  12/11/2023 - 249 lb    Objective     There were no vitals taken for this visit.    Weight Loss Pharmacotherapy:    Mounjaro 2.5 mg: Inject 2.5 mg under the skin once weekly on Saturdays  (Three injections completed as of 12/11/2023)      Secondary Prevention:   - Statin? Yes - Atorvastatin 20 mg   - ACE-I/ARB? Yes - Losartan 50 mg daily   - Aspirin? No    Pertinent PMH Review:  - PMH of Pancreatitis: No  - PMH of Retinopathy: No  - PMH of Urinary Tract Infections: No  - PMH of  MTC/MEN Type II: No    Labs:   Lab Results   Component Value Date    BILITOT 1.2 10/31/2023    CALCIUM 8.8 10/31/2023    CO2 25 10/31/2023     10/31/2023    CREATININE 0.79 10/31/2023    GLUCOSE 89 10/31/2023    ALKPHOS 47 10/31/2023    K 4.4 10/31/2023    PROT 7.0 10/31/2023     10/31/2023    AST 22 12/06/2023    ALT 25 12/06/2023    BUN 16 10/31/2023    ANIONGAP 12 10/31/2023    MG 2.20 10/31/2023    ALBUMIN 4.5 10/31/2023    GFRMALE >90 08/18/2022     Lab Results   Component Value Date    TRIG 43 12/06/2023    CHOL 102 12/06/2023    LDLCALC 47 12/06/2023    HDL 46.7 12/06/2023     Component      Latest Ref Rng 10/31/2023   LDL Calculated      <=99 mg/dL 141 (H)       Component      Latest Ref Rng 10/31/2023   Lipoprotein (a)      <=29 mg/dL <6      Lab Results   Component Value Date    HGBA1C 4.9 10/31/2023    HGBA1C 5.0 08/18/2022    HGBA1C 4.9 02/23/2022     The ASCVD Risk score (Jarod DK, et al., 2019) failed to calculate for the following reasons:    The valid total cholesterol range is 130 to 320 mg/dL    Health Maintenance:   Lipid Panel: 141 mg/dL (10/31/2023) - Recently started on moderate intensity statin therapy as of 10/31/2023    Assessment/Plan   Problem List Items Addressed This Visit    None  Visit Diagnoses       Class 2 obesity    -  Primary    Relevant Medications    tirzepatide (Mounjaro) 5 mg/0.5 mL pen injector          Continue:   Mounjaro 2.5 mg: Inject 2.5 mg under the skin once weekly on Saturdays  Advised to complete final injection of Mounjaro 2.5 mg for Saturday - 12/16/2023   Initiate:   Mounjaro 5 mg; Inject 5 mg under the skin once weekly on Saturdays  Complete 2.5 mg dose on 12/16/2023 then start Mounjaro 5 mg on 12/23/23  Education Provided to Patient:    Complete final injection wtih Mounjaro 2.5 mg under the skin once weekly for 11/16/2023  We discussed the likelihodd of titrating to 7.5 mg or 10 mg as his maintenance therapy to hope to achieve up to 15% baseline  body weight loss in conjunction with diet and exercise.   Follow-up: I recommend diabetes care be  12/29/2023 at 12:30  .  [facility]: Not scheduled     Sp Willis, PharmD    Continue all meds under the continuation of care with the referring provider and clinical pharmacy team.

## 2023-12-13 ENCOUNTER — PHARMACY VISIT (OUTPATIENT)
Dept: PHARMACY | Facility: CLINIC | Age: 58
End: 2023-12-13
Payer: MEDICARE

## 2023-12-20 ENCOUNTER — TELEPHONE (OUTPATIENT)
Dept: CARDIOLOGY | Facility: CLINIC | Age: 58
End: 2023-12-20
Payer: COMMERCIAL

## 2023-12-20 NOTE — TELEPHONE ENCOUNTER
Called pt for follow up- he is doing well with 2.5mg Mounjaro. His last dose of that amount is this Saturday. He will increase to 5 mg next week. He changed his diet, following a plant forward eating plan and choosing heart healthy proteins. Exercising regularly. He is down ~ 10 pounds since our visit.     He did notice a pain in his big toe starting a few days ago and was wondering if it was related to the medications. Denies ever having gout and does not see a visible blister. Suggested he follow up with his pcp if pain persists for 3-5 more days.     Pt is not measuring blood pressure at home. He is concerned that his cuff is too small. Will send recommendations for highly rated at home BP monitors.     All questions/concerns addressed at this time. Pt will reach out if he has questions.    Marcy Clinton RD, [facility]

## 2023-12-29 ENCOUNTER — TELEMEDICINE (OUTPATIENT)
Dept: PHARMACY | Facility: HOSPITAL | Age: 58
End: 2023-12-29
Payer: COMMERCIAL

## 2023-12-29 DIAGNOSIS — E66.9 CLASS 2 OBESITY: Primary | ICD-10-CM

## 2023-12-29 RX ORDER — TIRZEPATIDE 7.5 MG/.5ML
7.5 INJECTION, SOLUTION SUBCUTANEOUS
Qty: 2 ML | Refills: 1 | Status: SHIPPED | OUTPATIENT
Start: 2023-12-29 | End: 2024-02-09 | Stop reason: ALTCHOICE

## 2023-12-29 NOTE — PROGRESS NOTES
Patient is sent at the request of Abdullahi Ca MD for medication management.  My final recommendations will be communicated back to the requesting provider by way of shared medical record.    Subjective     German Oseguera is a 58 y.o. year old male patient with class I obesity (BMI 34.58 kg/m2),  HTN, and hyperlipidemia. He is referred for medication assisted weight loss management with the initiation of Mounjaro therapy. He does not have a history that is significant for prediabetes or type two diabetes mellitus. Today we are following up after completion of Mounjaro 2.5 mg and discussion of first dose of Mounjaro 5 mg.      No Known Allergies    Notes improved satiety with meals and decreased hunger/cravings since Mounjaro initiation. Does not endorse any GI complaints. Overall feels good with medication start. Stated that completing his intermittent fasting has been easier.     Intermittent fasts 2-3 days per week for 16 hours.      Exercise: lifts weight 3 days per week. Has eliptical and peloton    Cardiovascular risk factors include advanced age (older than 55 for men, 65 for women), dyslipidemia, male gender, and obesity (BMI >= 30 kg/m2). The patient is on an ACE inhibitor or angiotensin II receptor blocker.      Adverse Effects:   No noted adverse events reported with Mounjaro initiation     Weight:   11/15/2023 - 255 lb  12/11/2023 - 249 lb  12/29/2023 - 246 lb    Objective     There were no vitals taken for this visit.    Weight Loss Pharmacotherapy:    Mounjaro 5 mg: Inject 5 mg under the skin once weekly on Saturdays  (1/4 doses completed as of 12/29/2023, second injection to be completed 12/30/2023)    Secondary Prevention:   - Statin? Yes - Atorvastatin 20 mg   - ACE-I/ARB? Yes - Losartan 50 mg daily   - Aspirin? No    Pertinent PMH Review:  - PMH of Pancreatitis: No  - PMH of Retinopathy: No  - PMH of Urinary Tract Infections: No  - PMH of MTC/MEN Type II: No    Labs:   Lab Results    Component Value Date    BILITOT 1.2 10/31/2023    CALCIUM 8.8 10/31/2023    CO2 25 10/31/2023     10/31/2023    CREATININE 0.79 10/31/2023    GLUCOSE 89 10/31/2023    ALKPHOS 47 10/31/2023    K 4.4 10/31/2023    PROT 7.0 10/31/2023     10/31/2023    AST 22 12/06/2023    ALT 25 12/06/2023    BUN 16 10/31/2023    ANIONGAP 12 10/31/2023    MG 2.20 10/31/2023    ALBUMIN 4.5 10/31/2023    GFRMALE >90 08/18/2022     Lab Results   Component Value Date    TRIG 43 12/06/2023    CHOL 102 12/06/2023    LDLCALC 47 12/06/2023    HDL 46.7 12/06/2023     Component      Latest Ref Rng 10/31/2023   LDL Calculated      <=99 mg/dL 141 (H)       Component      Latest Ref Rng 10/31/2023   Lipoprotein (a)      <=29 mg/dL <6      Lab Results   Component Value Date    HGBA1C 4.9 10/31/2023    HGBA1C 5.0 08/18/2022    HGBA1C 4.9 02/23/2022     The ASCVD Risk score (Jarod SMITH, et al., 2019) failed to calculate for the following reasons:    The valid total cholesterol range is 130 to 320 mg/dL    Health Maintenance:   Lipid Panel: 141 mg/dL (10/31/2023) - Recently started on moderate intensity statin therapy as of 10/31/2023    Assessment/Plan   Problem List Items Addressed This Visit       Class 2 obesity - Primary    Relevant Medications    tirzepatide (Mounjaro) 7.5 mg/0.5 mL pen injector     Continue:   Mounjaro 5 mg: Inject 5 mg under the skin once weekly on Saturdays  Three injections remaining of Mounjaro 5 mg on 12/29/2023  Patient to complete second dose on 12/30/2023  We will discuss titration on 1/12/2024 to Mounjaro 7.5 mg  Mounjaro 7.5 mg order placed today with anticipation of titration based on reported tolerability to Mounjaro 5 mg   Education Provided to Patient:    We discussed the likelihodd of titrating to 7.5 mg or 10 mg as his maintenance therapy to hope to achieve up to 15% baseline body weight loss in conjunction with diet and exercise.   Follow-up: I recommend diabetes care be  1/12/2024 at 5 pm   .  [facility] Follow-Up: Not scheduled     Sp Willis, PharmD    Continue all meds under the continuation of care with the referring provider and clinical pharmacy team.

## 2024-01-12 ENCOUNTER — TELEMEDICINE (OUTPATIENT)
Dept: PHARMACY | Facility: HOSPITAL | Age: 59
End: 2024-01-12
Payer: COMMERCIAL

## 2024-01-12 DIAGNOSIS — E66.9 CLASS 2 OBESITY: ICD-10-CM

## 2024-01-13 ENCOUNTER — PHARMACY VISIT (OUTPATIENT)
Dept: PHARMACY | Facility: CLINIC | Age: 59
End: 2024-01-13
Payer: MEDICARE

## 2024-01-13 PROCEDURE — RXMED WILLOW AMBULATORY MEDICATION CHARGE

## 2024-01-13 NOTE — PROGRESS NOTES
Patient is sent at the request of Abdullahi Ca MD for medication management.  My final recommendations will be communicated back to the requesting provider by way of shared medical record.    Subjective     German Oseguera is a 58 y.o. year old male patient with class I obesity (BMI 34.58 kg/m2),  HTN, and hyperlipidemia. He is referred for medication assisted weight loss management with the initiation of Mounjaro therapy. He does not have a history that is significant for prediabetes or type two diabetes mellitus. Today we are following up to discuss titration from Mounjaro 5 mg to Mounjaro 7.5 mg.       No Known Allergies    Notes improved satiety with meals and decreased hunger/cravings since Mounjaro initiation. Does not endorse any GI complaints. Overall feels good with medication start. Stated that completing his intermittent fasting has been easier.     Intermittent fasts 2-3 days per week for 16 hours.      Exercise: lifts weight 3 days per week. Has eliptical and peloton    Cardiovascular risk factors include advanced age (older than 55 for men, 65 for women), dyslipidemia, male gender, and obesity (BMI >= 30 kg/m2). The patient is on an ACE inhibitor or angiotensin II receptor blocker.      Adverse Effects:   No noted adverse events reported with Mounjaro initiation     Weight:   11/15/2023 - 255 lb  12/11/2023 - 249 lb  12/29/2023 - 246 lb    Objective     There were no vitals taken for this visit.    Weight Loss Pharmacotherapy:    Mounjaro 5 mg: Inject 5 mg under the skin once weekly on Saturdays      Secondary Prevention:   - Statin? Yes - Atorvastatin 20 mg   - ACE-I/ARB? Yes - Losartan 50 mg daily   - Aspirin? No    Pertinent PMH Review:  - PMH of Pancreatitis: No  - PMH of Retinopathy: No  - PMH of Urinary Tract Infections: No  - PMH of MTC/MEN Type II: No    Labs:   Lab Results   Component Value Date    BILITOT 1.2 10/31/2023    CALCIUM 8.8 10/31/2023    CO2 25 10/31/2023      10/31/2023    CREATININE 0.79 10/31/2023    GLUCOSE 89 10/31/2023    ALKPHOS 47 10/31/2023    K 4.4 10/31/2023    PROT 7.0 10/31/2023     10/31/2023    AST 22 12/06/2023    ALT 25 12/06/2023    BUN 16 10/31/2023    ANIONGAP 12 10/31/2023    MG 2.20 10/31/2023    ALBUMIN 4.5 10/31/2023    GFRMALE >90 08/18/2022     Lab Results   Component Value Date    TRIG 43 12/06/2023    CHOL 102 12/06/2023    LDLCALC 47 12/06/2023    HDL 46.7 12/06/2023      Component      Latest Ref Rng 10/31/2023   Lipoprotein (a)      <=29 mg/dL <6      Lab Results   Component Value Date    HGBA1C 4.9 10/31/2023    HGBA1C 5.0 08/18/2022    HGBA1C 4.9 02/23/2022     The ASCVD Risk score (Jarod SMITH, et al., 2019) failed to calculate for the following reasons:    The valid total cholesterol range is 130 to 320 mg/dL    Health Maintenance:   Lipid Panel: 141 mg/dL (10/31/2023) --> 47 mg/dL on 12/6/2023 after initiation of moderate intensity statin     Assessment/Plan   Problem List Items Addressed This Visit       Class 2 obesity     Continue:   Mounjaro 5 mg: Inject 5 mg under the skin once weekly on Saturdays  Finish current supply of Mounjaro 5 mg then start Mounjaro 7.5 mg   Mounjaro 7.5 mg: Inject Mounjaro 7.5 mg under the skin on Saturdays  Patient has picked up titration dose and will complete first injection on 1/20/2024  Education Provided to Patient:    We discussed the likelihood of titrating to 7.5 mg or 10 mg as his maintenance therapy to hope to achieve up to 15% baseline body weight loss in conjunction with diet and exercise.   Follow-up: I recommend diabetes care be  1/12/2024 at 5 pm  .  [facility] Follow-Up: Not scheduled     Sp Willis, PharmD    Continue all meds under the continuation of care with the referring provider and clinical pharmacy team.

## 2024-01-24 ENCOUNTER — APPOINTMENT (OUTPATIENT)
Dept: CARDIOLOGY | Facility: HOSPITAL | Age: 59
End: 2024-01-24
Payer: COMMERCIAL

## 2024-01-24 DIAGNOSIS — E78.5 HYPERLIPIDEMIA, UNSPECIFIED HYPERLIPIDEMIA TYPE: Primary | ICD-10-CM

## 2024-01-25 DIAGNOSIS — E78.9 LIPID DISORDER: ICD-10-CM

## 2024-01-25 DIAGNOSIS — I10 HYPERTENSION, UNSPECIFIED TYPE: ICD-10-CM

## 2024-01-25 RX ORDER — ATORVASTATIN CALCIUM 20 MG/1
20 TABLET, FILM COATED ORAL DAILY
Qty: 90 TABLET | Refills: 3 | Status: SHIPPED | OUTPATIENT
Start: 2024-01-25 | End: 2025-01-24

## 2024-01-25 RX ORDER — LOSARTAN POTASSIUM 50 MG/1
50 TABLET ORAL DAILY
Qty: 90 TABLET | Refills: 3 | Status: SHIPPED | OUTPATIENT
Start: 2024-01-25

## 2024-01-30 DIAGNOSIS — J01.90 ACUTE NON-RECURRENT SINUSITIS, UNSPECIFIED LOCATION: Primary | ICD-10-CM

## 2024-01-30 RX ORDER — BENZONATATE 100 MG/1
100 CAPSULE ORAL 3 TIMES DAILY PRN
Qty: 30 CAPSULE | Refills: 0 | Status: SHIPPED | OUTPATIENT
Start: 2024-01-30 | End: 2024-03-07 | Stop reason: WASHOUT

## 2024-01-30 RX ORDER — FLUTICASONE PROPIONATE 50 MCG
2 SPRAY, SUSPENSION (ML) NASAL DAILY
Qty: 16 G | Refills: 1 | Status: SHIPPED | OUTPATIENT
Start: 2024-01-30 | End: 2024-03-07 | Stop reason: WASHOUT

## 2024-01-30 RX ORDER — AZITHROMYCIN 250 MG/1
TABLET, FILM COATED ORAL
Qty: 6 TABLET | Refills: 0 | Status: SHIPPED | OUTPATIENT
Start: 2024-01-30 | End: 2024-01-30 | Stop reason: ALTCHOICE

## 2024-01-30 RX ORDER — AZITHROMYCIN 250 MG/1
TABLET, FILM COATED ORAL
Qty: 6 TABLET | Refills: 0 | Status: SHIPPED | OUTPATIENT
Start: 2024-01-30 | End: 2024-02-03

## 2024-01-30 NOTE — PROGRESS NOTES
Uri/sinus congestion-       Infection Vs Inflammation Vs Allergic    Zpak    Flonase BID    Tessalon Perles TID/PRN cough

## 2024-02-09 ENCOUNTER — TELEMEDICINE (OUTPATIENT)
Dept: PHARMACY | Facility: HOSPITAL | Age: 59
End: 2024-02-09
Payer: COMMERCIAL

## 2024-02-09 ENCOUNTER — PHARMACY VISIT (OUTPATIENT)
Dept: PHARMACY | Facility: CLINIC | Age: 59
End: 2024-02-09
Payer: MEDICARE

## 2024-02-09 DIAGNOSIS — E66.9 CLASS 2 OBESITY: Primary | ICD-10-CM

## 2024-02-09 PROCEDURE — RXMED WILLOW AMBULATORY MEDICATION CHARGE

## 2024-02-09 RX ORDER — TIRZEPATIDE 10 MG/.5ML
10 INJECTION, SOLUTION SUBCUTANEOUS
Qty: 2 ML | Refills: 1 | Status: SHIPPED | OUTPATIENT
Start: 2024-02-09 | End: 2024-03-07 | Stop reason: DRUGHIGH

## 2024-02-09 NOTE — PROGRESS NOTES
Patient is sent at the request of Abdullahi Ca MD for medication management.  My final recommendations will be communicated back to the requesting provider by way of shared medical record.    Subjective     German Oseguera is a 58 y.o. year old male patient with class I obesity (BMI 34.58 kg/m2),  HTN, and hyperlipidemia. He is referred for medication assisted weight loss management with the initiation of Mounjaro therapy. He does not have a history that is significant for prediabetes or type two diabetes mellitus. Today we are following up to discuss tolerability of dose increase of Mounjaro to 7.5 mg.       No Known Allergies    Notes improved satiety with meals and decreased hunger/cravings since Mounjaro initiation. Does not endorse any GI complaints. Overall feels good. Stated that completing his intermittent fasting has been easier.     Intermittent fasts 2-3 days per week for 16 hours.      Exercise: lifts weight 3 days per week. Has eliptical and peloton    Cardiovascular risk factors include advanced age (older than 55 for men, 65 for women), dyslipidemia, male gender, and obesity (BMI >= 30 kg/m2). The patient is on an ACE inhibitor or angiotensin II receptor blocker.      Adverse Effects:   No noted adverse events reported with Mounjaro initiation     Weight:   11/15/2023 - 255 lb  2023 - 249 lb  2023 - 246 lb    Objective     There were no vitals taken for this visit.    Weight Loss Pharmacotherapy:    Mounjaro 7.5 mg: Inject 7.5 mg under the skin once weekly on   (Doses remainin)    Secondary Prevention:   - Statin? Yes - Atorvastatin 20 mg   - ACE-I/ARB? Yes - Losartan 50 mg daily   - Aspirin? No    Pertinent PMH Review:  - PMH of Pancreatitis: No  - PMH of Retinopathy: No  - PMH of Urinary Tract Infections: No  - PMH of MTC/MEN Type II: No    Labs:   Lab Results   Component Value Date    BILITOT 1.2 10/31/2023    CALCIUM 8.8 10/31/2023    CO2 25 10/31/2023      10/31/2023    CREATININE 0.79 10/31/2023    GLUCOSE 89 10/31/2023    ALKPHOS 47 10/31/2023    K 4.4 10/31/2023    PROT 7.0 10/31/2023     10/31/2023    AST 22 12/06/2023    ALT 25 12/06/2023    BUN 16 10/31/2023    ANIONGAP 12 10/31/2023    MG 2.20 10/31/2023    ALBUMIN 4.5 10/31/2023    GFRMALE >90 08/18/2022     Lab Results   Component Value Date    TRIG 43 12/06/2023    CHOL 102 12/06/2023    LDLCALC 47 12/06/2023    HDL 46.7 12/06/2023      Component      Latest Ref Rng 10/31/2023   Lipoprotein (a)      <=29 mg/dL <6      Lab Results   Component Value Date    HGBA1C 4.9 10/31/2023    HGBA1C 5.0 08/18/2022    HGBA1C 4.9 02/23/2022     The ASCVD Risk score (Jarod SMITH, et al., 2019) failed to calculate for the following reasons:    The valid total cholesterol range is 130 to 320 mg/dL    Health Maintenance:   Lipid Panel: 141 mg/dL (10/31/2023) --> 47 mg/dL on 12/6/2023 after initiation of moderate intensity statin     Assessment/Plan   Problem List Items Addressed This Visit          Endocrine/Metabolic    Class 2 obesity - Primary    Relevant Medications    tirzepatide (Mounjaro) 10 mg/0.5 mL pen injector   1.  Continue:   Mounjaro 7.5 mg: Inject 7.5 mg under the skin once weekly on Saturdays  Finish current supply of Mounjaro 7.5 mg then start Mounjaro 10 mg   Start:   Mounjaro 10 mg: Inject Mounjaro 10 mg under the skin on Saturdays  Follow-up: I recommend diabetes care be  3/8/2024 at 4 pm  .  [facility] Follow-Up: Not scheduled     Jenna George, PharmD    Continue all meds under the continuation of care with the referring provider and clinical pharmacy team.

## 2024-02-13 DIAGNOSIS — R94.39 ABNORMAL STRESS TEST: ICD-10-CM

## 2024-02-13 DIAGNOSIS — E78.5 HYPERLIPIDEMIA, UNSPECIFIED HYPERLIPIDEMIA TYPE: Primary | ICD-10-CM

## 2024-02-19 ENCOUNTER — LAB (OUTPATIENT)
Dept: LAB | Facility: LAB | Age: 59
End: 2024-02-19
Payer: COMMERCIAL

## 2024-02-19 DIAGNOSIS — E78.5 HYPERLIPIDEMIA, UNSPECIFIED HYPERLIPIDEMIA TYPE: ICD-10-CM

## 2024-02-19 DIAGNOSIS — R94.39 ABNORMAL STRESS TEST: ICD-10-CM

## 2024-02-19 LAB
ALBUMIN SERPL BCP-MCNC: 4.4 G/DL (ref 3.4–5)
ALP SERPL-CCNC: 51 U/L (ref 33–136)
ALT SERPL W P-5'-P-CCNC: 23 U/L (ref 10–52)
ANION GAP SERPL CALC-SCNC: 13 MMOL/L (ref 10–20)
AST SERPL W P-5'-P-CCNC: 22 U/L (ref 9–39)
BILIRUB SERPL-MCNC: 0.8 MG/DL (ref 0–1.2)
BNP SERPL-MCNC: 31 PG/ML (ref 0–99)
BUN SERPL-MCNC: 12 MG/DL (ref 6–23)
CALCIUM SERPL-MCNC: 9.3 MG/DL (ref 8.6–10.3)
CHLORIDE SERPL-SCNC: 104 MMOL/L (ref 98–107)
CO2 SERPL-SCNC: 27 MMOL/L (ref 21–32)
CREAT SERPL-MCNC: 0.79 MG/DL (ref 0.5–1.3)
CREAT UR-MCNC: 64.2 MG/DL (ref 20–370)
EGFRCR SERPLBLD CKD-EPI 2021: >90 ML/MIN/1.73M*2
GLUCOSE SERPL-MCNC: 75 MG/DL (ref 74–99)
MICROALBUMIN UR-MCNC: <7 MG/L
MICROALBUMIN/CREAT UR: NORMAL MG/G{CREAT}
POTASSIUM SERPL-SCNC: 4 MMOL/L (ref 3.5–5.3)
PROT SERPL-MCNC: 7.1 G/DL (ref 6.4–8.2)
SODIUM SERPL-SCNC: 140 MMOL/L (ref 136–145)

## 2024-02-19 PROCEDURE — 82043 UR ALBUMIN QUANTITATIVE: CPT

## 2024-02-19 PROCEDURE — 83880 ASSAY OF NATRIURETIC PEPTIDE: CPT

## 2024-02-19 PROCEDURE — 36415 COLL VENOUS BLD VENIPUNCTURE: CPT

## 2024-02-19 PROCEDURE — 83036 HEMOGLOBIN GLYCOSYLATED A1C: CPT

## 2024-02-19 PROCEDURE — 82570 ASSAY OF URINE CREATININE: CPT

## 2024-02-19 PROCEDURE — 80053 COMPREHEN METABOLIC PANEL: CPT

## 2024-02-19 PROCEDURE — 83704 LIPOPROTEIN BLD QUAN PART: CPT

## 2024-02-19 PROCEDURE — 86141 C-REACTIVE PROTEIN HS: CPT

## 2024-02-20 LAB
CRP SERPL HS-MCNC: 1.8 MG/L
EST. AVERAGE GLUCOSE BLD GHB EST-MCNC: 94 MG/DL
HBA1C MFR BLD: 4.9 %

## 2024-02-23 LAB
CHOLEST SERPL-MCNC: 118 MG/DL (ref 100–199)
HDL SERPL-SCNC: 32.1 UMOL/L
HDLC SERPL-MCNC: 60 MG/DL
LDL SERPL QN: 20 NM
LDL SERPL-SCNC: 305 NMOL/L
LDL SMALL SERPL-SCNC: 253 NMOL/L
LDLC SERPL CALC-MCNC: 47 MG/DL (ref 0–99)
LP-IR SCORE SERPL: 30
TRIGL SERPL-MCNC: 42 MG/DL (ref 0–149)

## 2024-03-07 ENCOUNTER — TELEPHONE (OUTPATIENT)
Dept: PHARMACY | Facility: HOSPITAL | Age: 59
End: 2024-03-07
Payer: COMMERCIAL

## 2024-03-07 ENCOUNTER — PHARMACY VISIT (OUTPATIENT)
Dept: PHARMACY | Facility: CLINIC | Age: 59
End: 2024-03-07
Payer: MEDICARE

## 2024-03-07 DIAGNOSIS — E66.9 CLASS 2 OBESITY: Primary | ICD-10-CM

## 2024-03-07 PROCEDURE — RXMED WILLOW AMBULATORY MEDICATION CHARGE

## 2024-03-07 RX ORDER — TIRZEPATIDE 12.5 MG/.5ML
12.5 INJECTION, SOLUTION SUBCUTANEOUS
Qty: 2 ML | Refills: 3 | Status: SHIPPED | OUTPATIENT
Start: 2024-03-07 | End: 2024-05-28 | Stop reason: DRUGHIGH

## 2024-03-07 NOTE — TELEPHONE ENCOUNTER
Patient reached out needing refills on Mounjaro. Patient has completed 4 doses of the 10 mg and would like to titrate up to the 12.5 mg dose for increased weight loss. We had a telephone visit scheduled tomorrow, however, patient requested we talk today and reschedule tomorrows visit for another time. Rescheduled to 3/29 to discuss Mounjaro 12.5 mg tolerability.     Will send refill to [facility] and patient will .      Last visit with ESTER: 11/15/2023  Last PharmD follow up: 2/9/2024  Next PharmD follow up: 3/29/2024    Jenna George, PharmD

## 2024-03-08 ENCOUNTER — APPOINTMENT (OUTPATIENT)
Dept: PHARMACY | Facility: HOSPITAL | Age: 59
End: 2024-03-08
Payer: COMMERCIAL

## 2024-03-29 ENCOUNTER — TELEMEDICINE (OUTPATIENT)
Dept: PHARMACY | Facility: HOSPITAL | Age: 59
End: 2024-03-29
Payer: COMMERCIAL

## 2024-03-29 DIAGNOSIS — E66.9 CLASS 2 OBESITY: Primary | ICD-10-CM

## 2024-03-29 PROCEDURE — RXMED WILLOW AMBULATORY MEDICATION CHARGE

## 2024-03-29 RX ORDER — TIRZEPATIDE 10 MG/.5ML
10 INJECTION, SOLUTION SUBCUTANEOUS
Qty: 2 ML | Refills: 2 | Status: SHIPPED | OUTPATIENT
Start: 2024-03-29 | End: 2024-04-17 | Stop reason: DRUGHIGH

## 2024-03-29 NOTE — PROGRESS NOTES
Patient is sent at the request of Abdullahi Ca MD for medication management.  My final recommendations will be communicated back to the requesting provider by way of shared medical record.    Subjective     German Oseguera is a 58 y.o. year old male patient with class I obesity (BMI 34.58 kg/m2),  HTN, and hyperlipidemia. He is referred for medication assisted weight loss management with the initiation of Mounjaro therapy. He does not have a history that is significant for prediabetes or type two diabetes mellitus. Today we are following up to discuss tolerability of dose increase of Mounjaro to 12.5 mg.       No Known Allergies    Notes improved satiety with meals and decreased hunger/cravings since Mounjaro initiation. Does not endorse any GI complaints. Overall feels good. Stated that completing his intermittent fasting has been easier.     Intermittent fasts 2-3 days per week for 16 hours.     Exercise: lifts weight 3 days per week. Has eliptical and peloton.    Cardiovascular risk factors include advanced age (older than 55 for men, 65 for women), dyslipidemia, male gender, and obesity (BMI >= 30 kg/m2). The patient is on an ACE inhibitor or angiotensin II receptor blocker.      Adverse Effects:   No noted adverse events reported with Mounjaro     Weight:   11/15/2023 - 255 lb  2023 - 249 lb  2023 - 246 lb  3/29/2024 - 230 lb    Objective     There were no vitals taken for this visit.    Weight Loss Pharmacotherapy:    Mounjaro 12.5 mg: Inject 12.5 mg under the skin once weekly on   (Doses remainin)    Secondary Prevention:   - Statin? Yes - Atorvastatin 20 mg   - ACE-I/ARB? Yes - Losartan 50 mg daily   - Aspirin? No    Pertinent PMH Review:  - PMH of Pancreatitis: No  - PMH of Retinopathy: No  - PMH of Urinary Tract Infections: No  - PMH of MTC/MEN Type II: No    Labs:   Lab Results   Component Value Date    BILITOT 0.8 2024    CALCIUM 9.3 2024    CO2 27 2024      02/19/2024    CREATININE 0.79 02/19/2024    GLUCOSE 75 02/19/2024    ALKPHOS 51 02/19/2024    K 4.0 02/19/2024    PROT 7.1 02/19/2024     02/19/2024    AST 22 02/19/2024    ALT 23 02/19/2024    BUN 12 02/19/2024    ANIONGAP 13 02/19/2024    MG 2.20 10/31/2023    ALBUMIN 4.4 02/19/2024    GFRMALE >90 08/18/2022     Lab Results   Component Value Date    TRIG 43 12/06/2023    CHOL 102 12/06/2023    LDLCALC 47 12/06/2023    HDL 46.7 12/06/2023      Component      Latest Ref Rng 10/31/2023   Lipoprotein (a)      <=29 mg/dL <6      Lab Results   Component Value Date    HGBA1C 4.9 02/19/2024    HGBA1C 4.9 10/31/2023    HGBA1C 5.0 08/18/2022     The ASCVD Risk score (Jarod SMITH, et al., 2019) failed to calculate for the following reasons:    The valid total cholesterol range is 130 to 320 mg/dL    Health Maintenance:   Lipid Panel: 141 mg/dL (10/31/2023) --> 47 mg/dL on 12/6/2023 after initiation of moderate intensity statin     Assessment/Plan   Problem List Items Addressed This Visit          Endocrine/Metabolic    Class 2 obesity - Primary   Patient's current weight reported as 230 lbs. Has lost 25 lbs (10% of TBW) since starting therapy plan. Current regimen includes Mounjaro 12.5 mg weekly which patient has been tolerating without adverse effects. Patient also notes he has not been as active since he has been traveling a lot for work recently. No changes to diet.     Mounjaro 12.5 mg and 15 mg are both on back order currently as of 3/29/24. Will have to go back down to 10 mg for now and will reach out to patient once either of the higher doses are in stock.   Target goal 5% to 10% weight loss within 3-6 months  Continue to focus on lifestyle modifications as discussed.    PLAN  Take Mounjaro 10 mg until higher doses back in stock. Will notify patient once stock available.    Follow Up: 5/3/24      Jenna George, PharmD    Continue all meds under the continuation of care with the referring provider and  clinical pharmacy team.

## 2024-03-30 ENCOUNTER — PHARMACY VISIT (OUTPATIENT)
Dept: PHARMACY | Facility: CLINIC | Age: 59
End: 2024-03-30
Payer: MEDICARE

## 2024-04-17 DIAGNOSIS — E66.9 CLASS 2 OBESITY: Primary | ICD-10-CM

## 2024-04-17 PROCEDURE — RXMED WILLOW AMBULATORY MEDICATION CHARGE

## 2024-04-17 RX ORDER — TIRZEPATIDE 15 MG/.5ML
15 INJECTION, SOLUTION SUBCUTANEOUS
Qty: 2 ML | Refills: 3 | Status: SHIPPED | OUTPATIENT
Start: 2024-04-21 | End: 2024-05-28 | Stop reason: DRUGHIGH

## 2024-04-17 NOTE — PROGRESS NOTES
Mounjaro 15 mg back in stock, will send in script and follow up with patient on 5/3/24 to assess tolerability.     Jenna George, PharmD  Clinical Pharmacist     none

## 2024-04-29 DIAGNOSIS — E78.5 HYPERLIPIDEMIA, UNSPECIFIED HYPERLIPIDEMIA TYPE: ICD-10-CM

## 2024-04-29 DIAGNOSIS — E66.9 CLASS 2 OBESITY: Primary | ICD-10-CM

## 2024-05-03 ENCOUNTER — PHARMACY VISIT (OUTPATIENT)
Dept: PHARMACY | Facility: CLINIC | Age: 59
End: 2024-05-03
Payer: MEDICARE

## 2024-05-03 ENCOUNTER — TELEMEDICINE (OUTPATIENT)
Dept: PHARMACY | Facility: HOSPITAL | Age: 59
End: 2024-05-03
Payer: COMMERCIAL

## 2024-05-03 DIAGNOSIS — E66.9 CLASS 2 OBESITY: Primary | ICD-10-CM

## 2024-05-03 NOTE — PROGRESS NOTES
[facility]   Follow Up  German Oseguera is a 58 y.o. male was referred to Clinical Pharmacy Team for diabetes management.     Referring Provider: Abdullahi Ca MD    Subjective   HPI    German Oseguera is a 58 y.o. year old male patient with class I obesity (BMI 34.58 kg/m2),  HTN, and hyperlipidemia. He is referred for medication assisted weight loss management with the initiation of Mounjaro therapy. He does not have a history that is significant for prediabetes or type two diabetes mellitus.       Pharmacotherapy   Mounjaro 10 mg inject 10 mg under the skin once a week    Previous Pharmacotherapy    None      Adverse Effects:   Patient denies any GI adverse effects (Upset stomach/diarrhea/constipation/nausea/vomiting)  Adherence: Takes medication as directed and reports no missed doses  Affordability/Accessibility: No issues reported      Reported Weight:  Date Weight (lbs)   11/15/23  12/11/23  12/29/23  3/29/24  5/3/24 255  249  246  230  230       Lifestyle:  Diet:  Intermittent fasts 2-3 days per week for 16 hours.   Physical activity:  lifts weight 3 days per week. Has eliptical and peloton.      Objective   Labs:   Lab Results   Component Value Date    BILITOT 0.8 02/19/2024    CALCIUM 9.3 02/19/2024    CO2 27 02/19/2024     02/19/2024    CREATININE 0.79 02/19/2024    GLUCOSE 75 02/19/2024    ALKPHOS 51 02/19/2024    K 4.0 02/19/2024    PROT 7.1 02/19/2024     02/19/2024    AST 22 02/19/2024    ALT 23 02/19/2024    BUN 12 02/19/2024    ANIONGAP 13 02/19/2024    MG 2.20 10/31/2023    ALBUMIN 4.4 02/19/2024    GFRMALE >90 08/18/2022     Lab Results   Component Value Date    TRIG 43 12/06/2023    CHOL 102 12/06/2023    LDLCALC 47 12/06/2023    HDL 46.7 12/06/2023     Lab Results   Component Value Date    HGBA1C 4.9 02/19/2024    HGBA1C 4.9 10/31/2023    HGBA1C 5.0 08/18/2022     The ASCVD Risk score (Jarod SMITH, et al., 2019) failed to calculate for the following reasons:    The  valid total cholesterol range is 130 to 320 mg/dL      Assessment/Plan   Problem List Items Addressed This Visit          Endocrine/Metabolic    Class 2 obesity - Primary     Patient's current weight reported as 230 lbs. Has lost 25 lbs (10% of TBW) since starting therapy plan. Current regimen includes Mounjaro 10 mg. Due to plateau of weight, plan to titrate up to 15 mg as patient was previously on 12.5 mg and then it was on back order and currently on back order.  Target goal 5% to 10% weight loss within 3-6 months  Continue to focus on lifestyle modifications as discussed.      Plan:  INCREASE  - Mounjaro 10 mg to 15 mg once weekly     Labs ordered: none     Follow up: 5/29/24  [facility] Follow Up: 6/21/24      Provided contact info and encouraged patient to reach out with any concerns or questions regarding medications.     Jenna George, PharmD    Continue all meds under the continuation of care with the referring provider and clinical pharmacy team.

## 2024-05-28 ENCOUNTER — PHARMACY VISIT (OUTPATIENT)
Dept: PHARMACY | Facility: CLINIC | Age: 59
End: 2024-05-28
Payer: MEDICARE

## 2024-05-28 PROCEDURE — RXMED WILLOW AMBULATORY MEDICATION CHARGE

## 2024-05-28 RX ORDER — TIRZEPATIDE 15 MG/.5ML
15 INJECTION, SOLUTION SUBCUTANEOUS
Qty: 6 ML | Refills: 3 | Status: SHIPPED | OUTPATIENT
Start: 2024-06-02

## 2024-05-28 NOTE — PROGRESS NOTES
Pharmacy [facility]   Follow Up  German Oseguera is a 58 y.o. male was referred to Clinical Pharmacy Team for diabetes management.     Referring Provider: Abdullahi Ca MD    Subjective   HPI    German Oseguera is a 58 y.o. year old male patient with class I obesity (BMI 34.58 kg/m2),  HTN, and hyperlipidemia. He is referred for medication assisted weight loss management with the initiation of Mounjaro therapy. He does not have a history that is significant for prediabetes or type two diabetes mellitus.       Pharmacotherapy   Mounjaro 15 mg inject 15 mg under the skin once a week    Previous Pharmacotherapy    None      Adverse Effects:   Patient denies any GI adverse effects (Upset stomach/diarrhea/constipation/nausea/vomiting)  Adherence: Takes medication as directed and reports no missed doses  Affordability/Accessibility: No issues reported      Reported Weight:  Date Weight (lbs)   11/15/23  12/11/23  12/29/23  3/29/24  5/3/24  5/28/24 255  249  246  230  230  227     Lifestyle:  Diet:  Intermittent fasts 2-3 days per week for 16 hours.   Physical activity:  lifts weight 3 days per week. Has eliptical and peloton.      Objective   Labs:   Lab Results   Component Value Date    BILITOT 0.8 02/19/2024    CALCIUM 9.3 02/19/2024    CO2 27 02/19/2024     02/19/2024    CREATININE 0.79 02/19/2024    GLUCOSE 75 02/19/2024    ALKPHOS 51 02/19/2024    K 4.0 02/19/2024    PROT 7.1 02/19/2024     02/19/2024    AST 22 02/19/2024    ALT 23 02/19/2024    BUN 12 02/19/2024    ANIONGAP 13 02/19/2024    MG 2.20 10/31/2023    ALBUMIN 4.4 02/19/2024    GFRMALE >90 08/18/2022     Lab Results   Component Value Date    TRIG 43 12/06/2023    CHOL 102 12/06/2023    LDLCALC 47 12/06/2023    HDL 46.7 12/06/2023     Lab Results   Component Value Date    HGBA1C 4.9 02/19/2024    HGBA1C 4.9 10/31/2023    HGBA1C 5.0 08/18/2022     The ASCVD Risk score (aJrod SMITH, et al., 2019) failed to calculate for the following  reasons:    The valid total cholesterol range is 130 to 320 mg/dL      Assessment/Plan   Problem List Items Addressed This Visit          Endocrine/Metabolic    Class 2 obesity - Primary    Relevant Medications    tirzepatide (Mounjaro) 15 mg/0.5 mL pen injector (Start on 6/2/2024)       Patient's current weight reported as 227 lbs. Has lost 28 lbs (11% of TBW) since starting therapy plan. Current regimen includes Mounjaro 15 mg. Will have patient continue on Mounjaro 15 mg and send in a years worth.   Target goal 5% to 10% weight loss within 3-6 months  Continue to focus on lifestyle modifications as discussed.      Plan:  CONTINUE  - Mounjaro 15 mg once weekly     Labs ordered: none     Follow up: 8/28/24  [facility] Follow Up: 6/21/24      Provided contact info and encouraged patient to reach out with any concerns or questions regarding medications.     Jenna George, PharmD    Continue all meds under the continuation of care with the referring provider and clinical pharmacy team.

## 2024-05-29 ENCOUNTER — TELEMEDICINE (OUTPATIENT)
Dept: PHARMACY | Facility: HOSPITAL | Age: 59
End: 2024-05-29
Payer: COMMERCIAL

## 2024-05-29 DIAGNOSIS — E66.9 CLASS 2 OBESITY: Primary | ICD-10-CM

## 2024-06-10 DIAGNOSIS — E78.2 MIXED HYPERLIPIDEMIA: Primary | ICD-10-CM

## 2024-06-10 DIAGNOSIS — E66.9 CLASS 2 OBESITY: ICD-10-CM

## 2024-06-17 ENCOUNTER — OFFICE VISIT (OUTPATIENT)
Dept: PRIMARY CARE | Facility: CLINIC | Age: 59
End: 2024-06-17
Payer: COMMERCIAL

## 2024-06-17 VITALS
DIASTOLIC BLOOD PRESSURE: 80 MMHG | TEMPERATURE: 97.6 F | OXYGEN SATURATION: 98 % | HEART RATE: 72 BPM | SYSTOLIC BLOOD PRESSURE: 120 MMHG

## 2024-06-17 DIAGNOSIS — H69.91 DYSFUNCTION OF RIGHT EUSTACHIAN TUBE: Primary | ICD-10-CM

## 2024-06-17 PROCEDURE — 3079F DIAST BP 80-89 MM HG: CPT | Performed by: INTERNAL MEDICINE

## 2024-06-17 PROCEDURE — 99213 OFFICE O/P EST LOW 20 MIN: CPT | Performed by: INTERNAL MEDICINE

## 2024-06-17 PROCEDURE — 3074F SYST BP LT 130 MM HG: CPT | Performed by: INTERNAL MEDICINE

## 2024-06-17 RX ORDER — LEVOCETIRIZINE DIHYDROCHLORIDE 5 MG/1
5 TABLET, FILM COATED ORAL EVERY EVENING
Qty: 14 TABLET | Refills: 0 | Status: SHIPPED | OUTPATIENT
Start: 2024-06-17 | End: 2024-07-01

## 2024-06-17 RX ORDER — FLUTICASONE PROPIONATE 50 MCG
1 SPRAY, SUSPENSION (ML) NASAL 2 TIMES DAILY
Qty: 16 G | Refills: 1 | Status: SHIPPED | OUTPATIENT
Start: 2024-06-17 | End: 2025-06-17

## 2024-06-17 NOTE — PROGRESS NOTES
Subjective   Patient ID: German Oseguera is a 58 y.o. male who presents for .    Rhinitis    R ear pressure    No cough    HPI     Review of Systems      No Fever/chills/headaches/dizziness/chest pains/ cough/ shortness of breath/palpitations/ abdominal pain /Nausea/vomiting/diarrhea/ constipation/urine frequency/blood in urine.      Objective     VSS    Physical Exam    No JVP elevation. No palpable Lymph Nodes. No Thyromegaly    HEENT- Sneezing    CVS-NL S1/S2 . No MRG    Lungs-CTA. B/S= B/L    Abdomen-Soft, Non-tender. No masses or HSM    Extremities: No C/C/E    Skin-No abnormal moles/rash        Assessment/Plan        R ear pressure/Rhinitis    ? Eustachian Tube Dysfunction      Infection Vs Inflammation Vs Allergic    Plan:    ? Covid PCR    Flonase BID    Xyzal 5 mg at bedtime    ? Zpack    Follow up/ Call with any concerns

## 2024-06-18 ENCOUNTER — LAB (OUTPATIENT)
Dept: LAB | Facility: LAB | Age: 59
End: 2024-06-18
Payer: COMMERCIAL

## 2024-06-18 DIAGNOSIS — E66.9 CLASS 2 OBESITY: ICD-10-CM

## 2024-06-18 DIAGNOSIS — E78.2 MIXED HYPERLIPIDEMIA: ICD-10-CM

## 2024-06-18 DIAGNOSIS — E78.5 HYPERLIPIDEMIA, UNSPECIFIED HYPERLIPIDEMIA TYPE: ICD-10-CM

## 2024-06-18 LAB
ALBUMIN SERPL BCP-MCNC: 4.8 G/DL (ref 3.4–5)
ALP SERPL-CCNC: 51 U/L (ref 33–136)
ALT SERPL W P-5'-P-CCNC: 19 U/L (ref 10–52)
ANION GAP SERPL CALC-SCNC: 13 MMOL/L (ref 10–20)
AST SERPL W P-5'-P-CCNC: 18 U/L (ref 9–39)
BASOPHILS # BLD AUTO: 0.05 X10*3/UL (ref 0–0.1)
BASOPHILS NFR BLD AUTO: 1.3 %
BILIRUB SERPL-MCNC: 1.1 MG/DL (ref 0–1.2)
BUN SERPL-MCNC: 18 MG/DL (ref 6–23)
CALCIUM SERPL-MCNC: 9.5 MG/DL (ref 8.6–10.6)
CHLORIDE SERPL-SCNC: 105 MMOL/L (ref 98–107)
CHOLEST SERPL-MCNC: 105 MG/DL (ref 0–199)
CHOLESTEROL/HDL RATIO: 2.2
CO2 SERPL-SCNC: 25 MMOL/L (ref 21–32)
CREAT SERPL-MCNC: 0.83 MG/DL (ref 0.5–1.3)
CREAT UR-MCNC: 188.2 MG/DL (ref 20–370)
CRP SERPL HS-MCNC: 1 MG/L
EGFRCR SERPLBLD CKD-EPI 2021: >90 ML/MIN/1.73M*2
EOSINOPHIL # BLD AUTO: 0.21 X10*3/UL (ref 0–0.7)
EOSINOPHIL NFR BLD AUTO: 5.5 %
ERYTHROCYTE [DISTWIDTH] IN BLOOD BY AUTOMATED COUNT: 12.4 % (ref 11.5–14.5)
EST. AVERAGE GLUCOSE BLD GHB EST-MCNC: 82 MG/DL
GLUCOSE SERPL-MCNC: 87 MG/DL (ref 74–99)
HBA1C MFR BLD: 4.5 %
HCT VFR BLD AUTO: 42.5 % (ref 41–52)
HDLC SERPL-MCNC: 48.3 MG/DL
HGB BLD-MCNC: 14.9 G/DL (ref 13.5–17.5)
IMM GRANULOCYTES # BLD AUTO: 0 X10*3/UL (ref 0–0.7)
IMM GRANULOCYTES NFR BLD AUTO: 0 % (ref 0–0.9)
LDLC SERPL CALC-MCNC: 49 MG/DL
LYMPHOCYTES # BLD AUTO: 1.26 X10*3/UL (ref 1.2–4.8)
LYMPHOCYTES NFR BLD AUTO: 32.8 %
MCH RBC QN AUTO: 31.4 PG (ref 26–34)
MCHC RBC AUTO-ENTMCNC: 35.1 G/DL (ref 32–36)
MCV RBC AUTO: 90 FL (ref 80–100)
MICROALBUMIN UR-MCNC: <7 MG/L
MICROALBUMIN/CREAT UR: NORMAL MG/G{CREAT}
MONOCYTES # BLD AUTO: 0.4 X10*3/UL (ref 0.1–1)
MONOCYTES NFR BLD AUTO: 10.4 %
NEUTROPHILS # BLD AUTO: 1.92 X10*3/UL (ref 1.2–7.7)
NEUTROPHILS NFR BLD AUTO: 50 %
NON HDL CHOLESTEROL: 57 MG/DL (ref 0–149)
NRBC BLD-RTO: 0 /100 WBCS (ref 0–0)
PLATELET # BLD AUTO: 167 X10*3/UL (ref 150–450)
POTASSIUM SERPL-SCNC: 4.4 MMOL/L (ref 3.5–5.3)
PROT SERPL-MCNC: 7 G/DL (ref 6.4–8.2)
RBC # BLD AUTO: 4.75 X10*6/UL (ref 4.5–5.9)
SODIUM SERPL-SCNC: 139 MMOL/L (ref 136–145)
TRIGL SERPL-MCNC: 41 MG/DL (ref 0–149)
VLDL: 8 MG/DL (ref 0–40)
WBC # BLD AUTO: 3.8 X10*3/UL (ref 4.4–11.3)

## 2024-06-18 PROCEDURE — 82570 ASSAY OF URINE CREATININE: CPT

## 2024-06-18 PROCEDURE — 85025 COMPLETE CBC W/AUTO DIFF WBC: CPT

## 2024-06-18 PROCEDURE — 83036 HEMOGLOBIN GLYCOSYLATED A1C: CPT

## 2024-06-18 PROCEDURE — 86141 C-REACTIVE PROTEIN HS: CPT

## 2024-06-18 PROCEDURE — 83704 LIPOPROTEIN BLD QUAN PART: CPT

## 2024-06-18 PROCEDURE — 36415 COLL VENOUS BLD VENIPUNCTURE: CPT

## 2024-06-18 PROCEDURE — 80053 COMPREHEN METABOLIC PANEL: CPT

## 2024-06-18 PROCEDURE — 82043 UR ALBUMIN QUANTITATIVE: CPT

## 2024-06-18 PROCEDURE — 82172 ASSAY OF APOLIPOPROTEIN: CPT

## 2024-06-18 PROCEDURE — 80061 LIPID PANEL: CPT

## 2024-06-19 LAB — LPA SERPL-MCNC: <6 MG/DL

## 2024-06-20 LAB
CHOLEST SERPL-MCNC: 103 MG/DL (ref 100–199)
HDL SERPL-SCNC: 32.9 UMOL/L
HDLC SERPL-MCNC: 59 MG/DL
LDL SERPL QN: 20 NM
LDL SERPL-SCNC: 397 NMOL/L
LDL SMALL SERPL-SCNC: 273 NMOL/L
LDLC SERPL CALC-MCNC: 33 MG/DL (ref 0–99)
LP-IR SCORE SERPL: 27
TRIGL SERPL-MCNC: 42 MG/DL (ref 0–149)

## 2024-06-21 ENCOUNTER — OFFICE VISIT (OUTPATIENT)
Dept: CARDIOLOGY | Facility: HOSPITAL | Age: 59
End: 2024-06-21
Payer: COMMERCIAL

## 2024-06-21 VITALS — BODY MASS INDEX: 31.19 KG/M2 | DIASTOLIC BLOOD PRESSURE: 76 MMHG | WEIGHT: 230 LBS | SYSTOLIC BLOOD PRESSURE: 112 MMHG

## 2024-06-21 DIAGNOSIS — R94.31 ABNORMAL EKG: Primary | ICD-10-CM

## 2024-06-21 DIAGNOSIS — I25.10 ATHEROSCLEROSIS OF NATIVE CORONARY ARTERY OF NATIVE HEART WITHOUT ANGINA PECTORIS: ICD-10-CM

## 2024-06-21 DIAGNOSIS — I10 HYPERTENSION, UNSPECIFIED TYPE: ICD-10-CM

## 2024-06-21 DIAGNOSIS — E66.9 CLASS 1 OBESITY: ICD-10-CM

## 2024-06-21 DIAGNOSIS — E78.00 PURE HYPERCHOLESTEROLEMIA: ICD-10-CM

## 2024-06-21 PROCEDURE — 99215 OFFICE O/P EST HI 40 MIN: CPT | Performed by: INTERNAL MEDICINE

## 2024-06-21 PROCEDURE — 3074F SYST BP LT 130 MM HG: CPT | Performed by: INTERNAL MEDICINE

## 2024-06-21 PROCEDURE — G2211 COMPLEX E/M VISIT ADD ON: HCPCS | Performed by: INTERNAL MEDICINE

## 2024-06-21 PROCEDURE — 1036F TOBACCO NON-USER: CPT | Performed by: INTERNAL MEDICINE

## 2024-06-21 PROCEDURE — 3078F DIAST BP <80 MM HG: CPT | Performed by: INTERNAL MEDICINE

## 2024-06-21 RX ORDER — OLMESARTAN MEDOXOMIL 40 MG/1
40 TABLET ORAL DAILY
Qty: 90 TABLET | Refills: 3 | Status: SHIPPED | OUTPATIENT
Start: 2024-06-21 | End: 2025-06-21

## 2024-06-21 ASSESSMENT — COLUMBIA-SUICIDE SEVERITY RATING SCALE - C-SSRS
2. HAVE YOU ACTUALLY HAD ANY THOUGHTS OF KILLING YOURSELF?: NO
6. HAVE YOU EVER DONE ANYTHING, STARTED TO DO ANYTHING, OR PREPARED TO DO ANYTHING TO END YOUR LIFE?: NO
1. IN THE PAST MONTH, HAVE YOU WISHED YOU WERE DEAD OR WISHED YOU COULD GO TO SLEEP AND NOT WAKE UP?: NO

## 2024-06-21 ASSESSMENT — ENCOUNTER SYMPTOMS
OCCASIONAL FEELINGS OF UNSTEADINESS: 0
LOSS OF SENSATION IN FEET: 0
DEPRESSION: 0

## 2024-06-21 ASSESSMENT — PATIENT HEALTH QUESTIONNAIRE - PHQ9
1. LITTLE INTEREST OR PLEASURE IN DOING THINGS: NOT AT ALL
SUM OF ALL RESPONSES TO PHQ9 QUESTIONS 1 AND 2: 0
2. FEELING DOWN, DEPRESSED OR HOPELESS: NOT AT ALL

## 2024-06-21 NOTE — PROGRESS NOTES
[facility] ([facility])      Reason for Visit  FOLLOW UP  VISIT [facility]    PROBLEM LIST    Mild CAD with luminal irregularities in the LAD with abnormal FFR in the distal portion (may represent artifact).  CAC score of 0.3.  Stress test on 10/31/2023 was abnormal with 2 mm downsloping ST depression and PACs and PVCs.  Total exercise capacity was normal [12.5 METS]  Class 1 Obesity with mild Steatosis on CT. BMI 10/23 was 34.5  Mild Hyperlipidemia. NMR lipoprotein profile revealed small LDL particles of 426 on 1/10/2022.  Most recent values were between 2 53-2 73 performed this year.  Frequent PVCs on Exercise test in 2023.       Referring Clinician:    History of Present Illness:   Mr. Oseguera returns for a follow-up visit he has done very well having lost >20  lbs of weight. He continues to exercise regularly. He is completely asymptomatic.  Hedenies dyspnea on exertion, angina pectoris, orthopnea or PND.  He exercises at least 3 times a week.  This comprises of both aerobic exercise and strength training.  His exercise capacity has improved.  He continues to adhere to a very strict diet comprising mostly of fruits and vegetables and seafood.  He recounts consuming red meat 2 times over the last 6 months.  He continues on an intermittent fasting regimen approximately 3 days a week that ranges more than 12 hours in duration.  His exercise regimen is followed by at least 2 protein shakes.  He denies angina pectoris orthopnea or dyspnea on exertion.          CTA Coronaries 2023  1. Minimal luminal irregularities noted in the proximal to mid LAD  and proximal RCA.  2. No evidence of obstructive coronary stenosis.  3. Coronary calcium score of 0.3.  4. CT scan was sent for the duration of noninvasive fractional flow  reserve using Heart Flow technology. These results will be updated  upon receipt of the FFR results.  5. Diffuse low-attenuation attenuation of the liver that  is  consistent with mild hepatic steatosis.    Carotid USG    Right Carotid: Findings are consistent with less than 50% stenosis of the right proximal ICA. Laminar flow seen by color Doppler.  Left Carotid: Findings are consistent with less than 50% stenosis of the left proximal ICA. Laminar flow seen by color Doppler.  Comparison:  Compared with study from 6/19/2020, no significant change.  Additional Findings:     Imaging & Doppler Findings:  Right Plaque Morph: No plaque identified in the right carotid artery.  Left Plaque Morph: No plaque identified in the left carotid artery.  Intimal thickening noted in 2017   Right                 Left    PSV     EDV           PSV     EDV  63 cm/s 12 cm/s ICA P 61 cm/s 10 cm/s      CMR 2014  1. The left ventricle is normal in size, shape, and has normal global   systolic function.  There are no segmental wall motion abnormalities.    Quantitative values are as noted above.  2. There are no findings to suggest prior ischemic damage or an   infiltrative process.  3. Normal aortic, mitral, and tricuspid valve function.  4. Ascending aorta at the upper limit of normal measuring 3.8 cm. The   rest of the thoracic aorta appears normal in course, caliber, and   contour.     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    Past Surgical History:  He has a past surgical history that includes Vasectomy (09/24/2013); Knee arthroscopy w/ debridement (09/24/2013); Refractive surgery (08/29/2017); Other surgical history (06/21/2020); Ablation of dysrhythmic focus  (09/16/2017); and CT angio coronary art with heartflow if score >30% (11/1/2023).    Social History:  He reports that he has never smoked. He has never used smokeless tobacco. He reports current alcohol use of about 1.0 standard drink of alcohol per week. He reports that he does not use drugs.  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    He does not smoke and is a social drinker.    Outpatient Medications:  Current Outpatient Medications   Medication Instructions    ascorbic acid, vitamin C, 500 mg capsule 1 capsule, oral, Daily    atorvastatin (LIPITOR) 20 mg, oral, Daily    azelastine-fluticasone (Dymista) 137-50 mcg/spray nasal spray 1 spray, Each Nostril, 2 times daily    cholecalciferol (Vitamin D-3) 50 mcg (2,000 unit) capsule 1 capsule, oral, Daily    cyclobenzaprine (Flexeril) 5 mg tablet 1 tablet, oral, Nightly    fluticasone (Flonase) 50 mcg/actuation nasal spray 1 spray, Each Nostril, 2 times daily, Shake gently. Before first use, prime pump. After use, clean tip and replace cap.    glucosamine/chondr appiah A sod (glucosamine-chondroitin) 750-600 mg tablet tablet 2 tablets, oral, Daily    levocetirizine (XYZAL) 5 mg, oral, Every evening    losartan (COZAAR) 50 mg, oral, Daily    Mounjaro 15 mg, subcutaneous, Once Weekly    multivitamin tablet 1 tablet, oral, Daily    mupirocin (Bactroban) 2 % ointment Topical    sildenafil (VIAGRA) 50 mg, oral, Daily PRN    tadalafil (CIALIS) 10 mg, oral, Daily PRN    tretinoin (Retin-A) 0.1 % cream Topical    Wixela Inhub 100-50 mcg/dose diskus inhaler 1 puff, inhalation, 2 times daily RT, For 14 days    zolpidem (Ambien) 10 mg tablet oral       Last Recorded Vitals:  There were no vitals filed for this visit.  There is no height or weight on file to calculate BMI.     Physical Examination:  General: Well appearing, well-nourished, in no acute distress.  He does have evidence of mild visceral  "adiposity.  HEENT: Normocephalic atraumatic, pupils equal and reactive to light, extraocular muscles intact, no conjunctival injection, oropharynx clear without exudates.  Neck: Normal carotid arterial pulses, no arterial bruits, no thyromegaly.  Cardiac: Regular rhythm and normal heart rate.  S1, S2 present and normal.  No murmurs, rubs, or gallops.   Pulmonary: No increased work of breathing, no wheezes or crackles.  GI: Normal bowel sounds, no organomegaly appreciated;  no abdominal bruits.  There is no evidence of hepatomegaly      Laboratory Studies:  Lab Results   Component Value Date    GLUCOSE 87 06/18/2024    CALCIUM 9.5 06/18/2024     06/18/2024    K 4.4 06/18/2024    CO2 25 06/18/2024     06/18/2024    BUN 18 06/18/2024    CREATININE 0.83 06/18/2024     Lab Results   Component Value Date    CHOL 105 06/18/2024    CHOL 102 12/06/2023    CHOL 212 (H) 10/31/2023     Lab Results   Component Value Date    HDL 48.3 06/18/2024    HDL 46.7 12/06/2023    HDL 57.2 10/31/2023     Lab Results   Component Value Date    LDLCALC 49 06/18/2024    LDLCALC 47 12/06/2023    LDLCALC 141 (H) 10/31/2023     Lab Results   Component Value Date    TRIG 41 06/18/2024    TRIG 43 12/06/2023    TRIG 67 10/31/2023     No components found for: \"CHOLHDL\"  Lab Results   Component Value Date    HGBA1C 4.5 06/18/2024    HGBA1C 4.9 02/19/2024    HGBA1C 4.9 10/31/2023     Lab Results   Component Value Date    GLUF 84 02/23/2022    LDLCALC 49 06/18/2024    CREATININE 0.83 06/18/2024        Cardiology Tests:    Echo:  No results found for this or any previous visit from the past 1095 days.    Stress Test:  Stress Test 11/6/2023    Cardiac Imaging:  CT angio coronary art with heartflow if score >30% 11/1/2023        Assessment and Plan:  German Oseguera is a 58 y.o. male who presents for a follow up evaluation in the [facility] program.  He is a patient with mild nonobstructive CAD comprising mostly of luminal irregularities secondary to " lipid rich plaque and an abnormal FFR in the distal LAD, mild insulin resistance mostly evidenced by hepatic steatosis on CT angiogram and class I obesity at the time of his enrollment in November 2023.  He has done spectacularly well with good reduction in weight on institution of a GLP-1 receptor agonist.  He is remains normoglycemic with a hemoglobin A1c of 4.5.  His LDL particle fractions including LDL particles total as well as small dense LDL are within normal distribution.  His current medical regimen includes tirzepatide 15 mg in conjunction with losartan 50 mg and atorvastatin 20 mg 1 p.o. daily.    PLAN.  Visceral Adiposity.  He has had a good response to tirzepatide.  His current weight is around 230 pounds which translates into a 25 pound weight loss over the last 6 to 8 months.  Hypertension.  Review of his home blood pressure recordings suggest systolic blood pressure in the 130 to 140 mmHg range.  He has been on Losartan which may not provide 24-hour blood pressure control.  Accordingly, we will switch to olmesartan 40 mg at bedtime.  His target blood pressures are less than 120 mmHg.  Visceral adiposity and hepatic steatosis.  We reviewed his prior CT angiogram that revealed CT elevation values less than 40 Hounsfield unit diffusely of his liver.  His physical examination is remarkably better with no evidence of palpable hepatomegaly.  Coronary artery disease.  Aggressive lipid-lowering with LDL particles less than 750.  LDL cholesterol target less than 50.        Education: Reviewed ‘ABCs’ of diabetes management (respective goals in parentheses):  A1C (<6), blood pressure (<130/80), and cholesterol (LDL <70).  Compliance at present is estimated to be excellent.   Follow up: 3 month      Abdullahi Ca MD, FACC, FANEEMA.  Chief, Cardiovascular Medicine  [facility] [place]  Chief Academic and Scientific Officer  [facility] Professor of  Medicine  Professor of Medicine, Radiology and Biomedical Engineering  [place], [place]

## 2024-06-21 NOTE — PATIENT INSTRUCTIONS
There are several ways to reduce your risk for heart disease and stroke through lifestyle measures.  These include changes to your diet to reduce salt, increase consumption of fruits and vegetables, choose whole grains such as whole-wheat, choose low fat dairy products and avoid saturated and transfats, reduce sugar intake and avoid snacks and sweets, and choose lean meats over high cholesterol fatty meat.  It is also recommended to increase physical activity such as brisk walking, jogging, swimming, or bicycling for at least 150 minutes/week.  This can be divided up over 30-minute periods 5 times per week.  It is also recommended that you try to get 8 hours of sleep per night.     Eating tips to reduce nausea and unwanted GI side effects from Ozempic and Mounjaro   Eat slowly  Eat smaller portions  Avoid laying down right after meal  Stop eating when feeling full  Avoid drinking from straw  Stay hydrated, drink small amounts of water throughout the day  Avoid strenuous exercise after eating  Avoid eating close too bedtime    Meal planning tips  Avoid fried foods, and high fat meals  Focus on bland foods  Choose water rich foods like soups, kefir, protein drinks/smoothies     Lifestyle suggestions  Keep a food/symptom diary, as it may be helpful to identify meal timing of foods that make nausea worse.  Engage in light exercise (walking), get fresh air    Additional tips for alleviating nausea:  Wait at least 30 minutes after GLP1-RA dose to eat, if feeling nauseated try crackers, apples, mint, mic root or mic tea  Avoid strong smells    If vomiting:  Eat smaller amounts of food in more frequent meals  Stay hydrated, but avoid drinks during meals, wait 30-60 minutes before and after meals to have liquids    If having diarrhea:  Generous hydration, i.e: water with lemon and tsp baking soda  Avoid dairy products, laxatives, coffee, alcoholic beverages, soft drinks, very cold or very hot foods, products that  contain sugar alcohols (sorbitol, mannitol, xylitol, maltitol), including gum and candy  Avoid (or temporarily reduce your intake of) foods with high fiber content such as grain and seed products, whole grain breads, artichokes, asparagus, beans, cabbage, lentils, mushrooms, onions, garlic, sugar snap peas, skinned fruits, apples, apricots, blackberries, cherries, mercedes, nectarine, pears, plum  Eat chicken broth, rice, carrots, very ripe fruit without skin  Gradually increase fiber back in when symptoms improve    If having constipation:  Ensure the amount of fiber in diet is adequate (goal is minimum of 25 grams per day).  Increase physical activity  Drink generous amounts of water

## 2024-07-08 ENCOUNTER — PHARMACY VISIT (OUTPATIENT)
Dept: PHARMACY | Facility: CLINIC | Age: 59
End: 2024-07-08
Payer: MEDICARE

## 2024-07-08 PROCEDURE — RXMED WILLOW AMBULATORY MEDICATION CHARGE

## 2024-07-23 ENCOUNTER — HOSPITAL ENCOUNTER (OUTPATIENT)
Dept: RADIOLOGY | Facility: HOSPITAL | Age: 59
Discharge: HOME | End: 2024-07-23
Payer: COMMERCIAL

## 2024-07-23 ENCOUNTER — HOSPITAL ENCOUNTER (OUTPATIENT)
Dept: RADIOLOGY | Facility: CLINIC | Age: 59
End: 2024-07-23
Payer: COMMERCIAL

## 2024-07-23 VITALS — WEIGHT: 229.28 LBS | BODY MASS INDEX: 31.05 KG/M2 | HEIGHT: 72 IN

## 2024-07-23 DIAGNOSIS — I10 HYPERTENSION, UNSPECIFIED TYPE: ICD-10-CM

## 2024-07-23 DIAGNOSIS — I25.10 ATHEROSCLEROSIS OF NATIVE CORONARY ARTERY OF NATIVE HEART WITHOUT ANGINA PECTORIS: ICD-10-CM

## 2024-07-23 DIAGNOSIS — R94.31 ABNORMAL EKG: ICD-10-CM

## 2024-07-23 PROCEDURE — 75561 CARDIAC MRI FOR MORPH W/DYE: CPT | Performed by: RADIOLOGY

## 2024-07-23 PROCEDURE — 75565 CARD MRI VELOC FLOW MAPPING: CPT

## 2024-07-23 PROCEDURE — 2550000001 HC RX 255 CONTRASTS: Performed by: INTERNAL MEDICINE

## 2024-07-23 PROCEDURE — A9575 INJ GADOTERATE MEGLUMI 0.1ML: HCPCS | Performed by: INTERNAL MEDICINE

## 2024-07-23 PROCEDURE — 75561 CARDIAC MRI FOR MORPH W/DYE: CPT

## 2024-07-23 RX ORDER — GADOTERATE MEGLUMINE 376.9 MG/ML
40 INJECTION INTRAVENOUS
Status: COMPLETED | OUTPATIENT
Start: 2024-07-23 | End: 2024-07-23

## 2024-07-29 ENCOUNTER — PHARMACY VISIT (OUTPATIENT)
Dept: PHARMACY | Facility: CLINIC | Age: 59
End: 2024-07-29
Payer: MEDICARE

## 2024-07-29 PROCEDURE — RXMED WILLOW AMBULATORY MEDICATION CHARGE

## 2024-08-19 ENCOUNTER — PHARMACY VISIT (OUTPATIENT)
Dept: PHARMACY | Facility: CLINIC | Age: 59
End: 2024-08-19
Payer: MEDICARE

## 2024-08-19 PROCEDURE — RXMED WILLOW AMBULATORY MEDICATION CHARGE

## 2024-08-21 ENCOUNTER — TELEMEDICINE (OUTPATIENT)
Dept: PHARMACY | Facility: HOSPITAL | Age: 59
End: 2024-08-21
Payer: COMMERCIAL

## 2024-08-21 DIAGNOSIS — E66.9 CLASS 2 OBESITY: Primary | ICD-10-CM

## 2024-08-21 RX ORDER — TIRZEPATIDE 15 MG/.5ML
15 INJECTION, SOLUTION SUBCUTANEOUS
Qty: 6 ML | Refills: 3 | Status: SHIPPED | OUTPATIENT
Start: 2024-08-25

## 2024-08-21 NOTE — PROGRESS NOTES
Pharmacy [facility]   Follow Up  German Oseguera is a 59 y.o. male was referred to Clinical Pharmacy Team for weight loss management.     Referring Provider: Abdullahi Ca MD    Subjective   HPI    German Oseguera is a 58 y.o. year old male patient with class I obesity (BMI 34.58 kg/m2),  HTN, and hyperlipidemia. He is referred for medication assisted weight loss management with the initiation of Mounjaro therapy. He does not have a history that is significant for prediabetes or type two diabetes mellitus.       Pharmacotherapy   Mounjaro 15 mg inject 15 mg under the skin once a week    Previous Pharmacotherapy    None      Adverse Effects:   Patient denies any GI adverse effects (Upset stomach/diarrhea/constipation/nausea/vomiting)  Adherence: Takes medication as directed and reports no missed doses  Affordability/Accessibility: No issues reported      Reported Weight:  Date Weight (lbs)   11/15/23  12/11/23  12/29/23  3/29/24  5/3/24  5/28/24  8/21/24 255  249  246  230  230  227  222     Lifestyle:  Diet:  Intermittent fasts 2-3 days per week for 16 hours.   Physical activity:  lifts weight 3 days per week. Has eliptical and peloton.      Objective   Labs:   Lab Results   Component Value Date    BILITOT 1.1 06/18/2024    CALCIUM 9.5 06/18/2024    CO2 25 06/18/2024     06/18/2024    CREATININE 0.83 06/18/2024    GLUCOSE 87 06/18/2024    ALKPHOS 51 06/18/2024    K 4.4 06/18/2024    PROT 7.0 06/18/2024     06/18/2024    AST 18 06/18/2024    ALT 19 06/18/2024    BUN 18 06/18/2024    ANIONGAP 13 06/18/2024    MG 2.20 10/31/2023    ALBUMIN 4.8 06/18/2024    GFRMALE >90 08/18/2022     Lab Results   Component Value Date    TRIG 41 06/18/2024    CHOL 105 06/18/2024    LDLCALC 49 06/18/2024    HDL 48.3 06/18/2024     Lab Results   Component Value Date    HGBA1C 4.5 06/18/2024    HGBA1C 4.9 02/19/2024    HGBA1C 4.9 10/31/2023     The ASCVD Risk score (Jarod SMITH, et al., 2019) failed to calculate for the  following reasons:    The valid total cholesterol range is 130 to 320 mg/dL      Assessment/Plan   Problem List Items Addressed This Visit          Endocrine/Metabolic    Class 2 obesity - Primary    Relevant Medications    tirzepatide (Mounjaro) 15 mg/0.5 mL pen injector (Start on 8/25/2024)     Patient's current weight reported as 222 lbs. Has lost 33 lbs (13% of TBW) since starting therapy plan. Current regimen includes Mounjaro 15 mg. Patient states his weight has reached a plateau. Will have patient continue on Mounjaro 15 mg and send in a years worth and follow up in 6 months.   Target goal 5% to 10% weight loss within 3-6 months  Continue to focus on lifestyle modifications as discussed.      Plan:  CONTINUE  - Mounjaro 15 mg once weekly     Labs ordered: none     Follow up: 2/20/25 (6 months)  [facility] Follow Up: not scheduled; last visit 6/21/24      Provided contact info and encouraged patient to reach out with any concerns or questions regarding medications.     Jenna George, PharmD    Continue all meds under the continuation of care with the referring provider and clinical pharmacy team.

## 2024-08-28 ENCOUNTER — APPOINTMENT (OUTPATIENT)
Dept: PHARMACY | Facility: HOSPITAL | Age: 59
End: 2024-08-28
Payer: COMMERCIAL

## 2024-09-09 PROCEDURE — RXMED WILLOW AMBULATORY MEDICATION CHARGE

## 2024-09-12 ENCOUNTER — PHARMACY VISIT (OUTPATIENT)
Dept: PHARMACY | Facility: CLINIC | Age: 59
End: 2024-09-12
Payer: MEDICARE

## 2024-10-14 ENCOUNTER — PHARMACY VISIT (OUTPATIENT)
Dept: PHARMACY | Facility: CLINIC | Age: 59
End: 2024-10-14
Payer: MEDICARE

## 2024-10-14 PROCEDURE — RXMED WILLOW AMBULATORY MEDICATION CHARGE

## 2024-11-04 PROCEDURE — RXMED WILLOW AMBULATORY MEDICATION CHARGE

## 2024-11-07 ENCOUNTER — APPOINTMENT (OUTPATIENT)
Dept: VASCULAR MEDICINE | Facility: HOSPITAL | Age: 59
End: 2024-11-07
Payer: COMMERCIAL

## 2024-11-07 ENCOUNTER — APPOINTMENT (OUTPATIENT)
Dept: INTEGRATIVE MEDICINE | Facility: CLINIC | Age: 59
End: 2024-11-07
Payer: COMMERCIAL

## 2024-11-07 ENCOUNTER — APPOINTMENT (OUTPATIENT)
Dept: RADIOLOGY | Facility: HOSPITAL | Age: 59
End: 2024-11-07
Payer: COMMERCIAL

## 2024-11-07 ENCOUNTER — APPOINTMENT (OUTPATIENT)
Dept: PRIMARY CARE | Facility: CLINIC | Age: 59
End: 2024-11-07

## 2024-11-07 ENCOUNTER — APPOINTMENT (OUTPATIENT)
Dept: CARDIOLOGY | Facility: HOSPITAL | Age: 59
End: 2024-11-07
Payer: COMMERCIAL

## 2024-11-11 ENCOUNTER — PHARMACY VISIT (OUTPATIENT)
Dept: PHARMACY | Facility: CLINIC | Age: 59
End: 2024-11-11
Payer: MEDICARE

## 2024-12-02 PROCEDURE — RXMED WILLOW AMBULATORY MEDICATION CHARGE

## 2024-12-05 ENCOUNTER — PHARMACY VISIT (OUTPATIENT)
Dept: PHARMACY | Facility: CLINIC | Age: 59
End: 2024-12-05
Payer: MEDICARE

## 2024-12-11 ENCOUNTER — LAB (OUTPATIENT)
Dept: LAB | Facility: LAB | Age: 59
End: 2024-12-11

## 2024-12-11 DIAGNOSIS — Z00.00 HEALTH MAINTENANCE EXAMINATION: ICD-10-CM

## 2024-12-11 LAB
25(OH)D3 SERPL-MCNC: 47 NG/ML (ref 30–100)
ALBUMIN SERPL BCP-MCNC: 4.7 G/DL (ref 3.4–5)
ALP SERPL-CCNC: 58 U/L (ref 33–136)
ALT SERPL W P-5'-P-CCNC: 27 U/L (ref 10–52)
ANION GAP SERPL CALC-SCNC: 10 MMOL/L (ref 10–20)
AST SERPL W P-5'-P-CCNC: 21 U/L (ref 9–39)
BASOPHILS # BLD AUTO: 0.07 X10*3/UL (ref 0–0.1)
BASOPHILS NFR BLD AUTO: 1.2 %
BILIRUB SERPL-MCNC: 0.8 MG/DL (ref 0–1.2)
BUN SERPL-MCNC: 16 MG/DL (ref 6–23)
CALCIUM SERPL-MCNC: 9.4 MG/DL (ref 8.6–10.6)
CHLORIDE SERPL-SCNC: 104 MMOL/L (ref 98–107)
CHOLEST SERPL-MCNC: 137 MG/DL (ref 0–199)
CHOLESTEROL/HDL RATIO: 2
CO2 SERPL-SCNC: 30 MMOL/L (ref 21–32)
CREAT SERPL-MCNC: 0.91 MG/DL (ref 0.5–1.3)
CRP SERPL HS-MCNC: 0.6 MG/L
DHEA-S SERPL-MCNC: 123 UG/DL (ref 28–290)
EGFRCR SERPLBLD CKD-EPI 2021: >90 ML/MIN/1.73M*2
EOSINOPHIL # BLD AUTO: 0.31 X10*3/UL (ref 0–0.7)
EOSINOPHIL NFR BLD AUTO: 5.5 %
ERYTHROCYTE [DISTWIDTH] IN BLOOD BY AUTOMATED COUNT: 12.7 % (ref 11.5–14.5)
EST. AVERAGE GLUCOSE BLD GHB EST-MCNC: 85 MG/DL
ESTRADIOL SERPL-MCNC: 28 PG/ML
FERRITIN SERPL-MCNC: 397 NG/ML (ref 20–300)
GLUCOSE SERPL-MCNC: 97 MG/DL (ref 74–99)
HBA1C MFR BLD: 4.6 %
HCT VFR BLD AUTO: 45 % (ref 41–52)
HDLC SERPL-MCNC: 67.7 MG/DL
HGB BLD-MCNC: 15.1 G/DL (ref 13.5–17.5)
IMM GRANULOCYTES # BLD AUTO: 0.02 X10*3/UL (ref 0–0.7)
IMM GRANULOCYTES NFR BLD AUTO: 0.4 % (ref 0–0.9)
INSULIN P FAST SERPL-ACNC: 15 UIU/ML (ref 3–25)
IRON SATN MFR SERPL: 32 % (ref 25–45)
IRON SERPL-MCNC: 122 UG/DL (ref 35–150)
LDLC SERPL CALC-MCNC: 60 MG/DL
LH SERPL-ACNC: 5.5 IU/L
LYMPHOCYTES # BLD AUTO: 1.49 X10*3/UL (ref 1.2–4.8)
LYMPHOCYTES NFR BLD AUTO: 26.6 %
MAGNESIUM SERPL-MCNC: 2.21 MG/DL (ref 1.6–2.4)
MCH RBC QN AUTO: 31 PG (ref 26–34)
MCHC RBC AUTO-ENTMCNC: 33.6 G/DL (ref 32–36)
MCV RBC AUTO: 92 FL (ref 80–100)
MONOCYTES # BLD AUTO: 0.43 X10*3/UL (ref 0.1–1)
MONOCYTES NFR BLD AUTO: 7.7 %
NEUTROPHILS # BLD AUTO: 3.29 X10*3/UL (ref 1.2–7.7)
NEUTROPHILS NFR BLD AUTO: 58.6 %
NON HDL CHOLESTEROL: 69 MG/DL (ref 0–149)
NRBC BLD-RTO: 0 /100 WBCS (ref 0–0)
PLATELET # BLD AUTO: 174 X10*3/UL (ref 150–450)
POTASSIUM SERPL-SCNC: 5.1 MMOL/L (ref 3.5–5.3)
PROT SERPL-MCNC: 7.3 G/DL (ref 6.4–8.2)
PSA SERPL-MCNC: 0.4 NG/ML
RBC # BLD AUTO: 4.87 X10*6/UL (ref 4.5–5.9)
SODIUM SERPL-SCNC: 139 MMOL/L (ref 136–145)
TIBC SERPL-MCNC: 380 UG/DL (ref 240–445)
TRIGL SERPL-MCNC: 46 MG/DL (ref 0–149)
TSH SERPL-ACNC: 1.76 MIU/L (ref 0.44–3.98)
UIBC SERPL-MCNC: 258 UG/DL (ref 110–370)
URATE SERPL-MCNC: 6.1 MG/DL (ref 4–7.5)
VIT B12 SERPL-MCNC: 770 PG/ML (ref 211–911)
VLDL: 9 MG/DL (ref 0–40)
WBC # BLD AUTO: 5.6 X10*3/UL (ref 4.4–11.3)

## 2024-12-11 PROCEDURE — 82670 ASSAY OF TOTAL ESTRADIOL: CPT

## 2024-12-11 PROCEDURE — 83550 IRON BINDING TEST: CPT

## 2024-12-11 PROCEDURE — 82627 DEHYDROEPIANDROSTERONE: CPT

## 2024-12-11 PROCEDURE — 84443 ASSAY THYROID STIM HORMONE: CPT

## 2024-12-11 PROCEDURE — 85025 COMPLETE CBC W/AUTO DIFF WBC: CPT

## 2024-12-11 PROCEDURE — 83002 ASSAY OF GONADOTROPIN (LH): CPT

## 2024-12-11 PROCEDURE — 80053 COMPREHEN METABOLIC PANEL: CPT

## 2024-12-11 PROCEDURE — 82607 VITAMIN B-12: CPT

## 2024-12-11 PROCEDURE — 83036 HEMOGLOBIN GLYCOSYLATED A1C: CPT

## 2024-12-11 PROCEDURE — 80061 LIPID PANEL: CPT

## 2024-12-11 PROCEDURE — 82306 VITAMIN D 25 HYDROXY: CPT

## 2024-12-11 PROCEDURE — 86141 C-REACTIVE PROTEIN HS: CPT

## 2024-12-11 PROCEDURE — 84550 ASSAY OF BLOOD/URIC ACID: CPT

## 2024-12-11 PROCEDURE — 84153 ASSAY OF PSA TOTAL: CPT

## 2024-12-11 PROCEDURE — 83525 ASSAY OF INSULIN: CPT

## 2024-12-11 PROCEDURE — 82728 ASSAY OF FERRITIN: CPT

## 2024-12-11 PROCEDURE — 83735 ASSAY OF MAGNESIUM: CPT

## 2024-12-11 PROCEDURE — 83540 ASSAY OF IRON: CPT

## 2024-12-13 ENCOUNTER — HOSPITAL ENCOUNTER (OUTPATIENT)
Dept: VASCULAR MEDICINE | Facility: HOSPITAL | Age: 59
Discharge: HOME | End: 2024-12-13
Payer: COMMERCIAL

## 2024-12-13 ENCOUNTER — APPOINTMENT (OUTPATIENT)
Dept: INTEGRATIVE MEDICINE | Facility: CLINIC | Age: 59
End: 2024-12-13

## 2024-12-13 ENCOUNTER — APPOINTMENT (OUTPATIENT)
Dept: PRIMARY CARE | Facility: CLINIC | Age: 59
End: 2024-12-13

## 2024-12-13 ENCOUNTER — HOSPITAL ENCOUNTER (OUTPATIENT)
Dept: RADIOLOGY | Facility: HOSPITAL | Age: 59
Discharge: HOME | End: 2024-12-13
Payer: COMMERCIAL

## 2024-12-13 ENCOUNTER — APPOINTMENT (OUTPATIENT)
Dept: CARDIOLOGY | Facility: HOSPITAL | Age: 59
End: 2024-12-13
Payer: COMMERCIAL

## 2024-12-13 VITALS
HEART RATE: 60 BPM | DIASTOLIC BLOOD PRESSURE: 62 MMHG | BODY MASS INDEX: 30.88 KG/M2 | WEIGHT: 228 LBS | HEIGHT: 72 IN | SYSTOLIC BLOOD PRESSURE: 118 MMHG | OXYGEN SATURATION: 98 %

## 2024-12-13 DIAGNOSIS — M54.6 ACUTE MIDLINE THORACIC BACK PAIN: ICD-10-CM

## 2024-12-13 DIAGNOSIS — Z00.00 ROUTINE GENERAL MEDICAL EXAMINATION AT A HEALTH CARE FACILITY: Primary | ICD-10-CM

## 2024-12-13 DIAGNOSIS — M25.572 ARTHRALGIA OF FOOT, LEFT: ICD-10-CM

## 2024-12-13 DIAGNOSIS — M25.571 ARTHRALGIA OF FOOT, RIGHT: ICD-10-CM

## 2024-12-13 DIAGNOSIS — R35.0 BENIGN PROSTATIC HYPERPLASIA WITH URINARY FREQUENCY: ICD-10-CM

## 2024-12-13 DIAGNOSIS — J30.0 VASOMOTOR RHINITIS: ICD-10-CM

## 2024-12-13 DIAGNOSIS — Z00.00 HEALTH MAINTENANCE EXAMINATION: ICD-10-CM

## 2024-12-13 DIAGNOSIS — N40.1 BENIGN PROSTATIC HYPERPLASIA WITH URINARY FREQUENCY: ICD-10-CM

## 2024-12-13 DIAGNOSIS — N52.9 ERECTILE DYSFUNCTION, UNSPECIFIED ERECTILE DYSFUNCTION TYPE: ICD-10-CM

## 2024-12-13 LAB
LPA SERPL-MCNC: <6 MG/DL
SHBG SERPL-SCNC: 72 NMOL/L (ref 19–76)

## 2024-12-13 PROCEDURE — 3008F BODY MASS INDEX DOCD: CPT | Performed by: INTERNAL MEDICINE

## 2024-12-13 PROCEDURE — 3074F SYST BP LT 130 MM HG: CPT | Performed by: INTERNAL MEDICINE

## 2024-12-13 PROCEDURE — 71046 X-RAY EXAM CHEST 2 VIEWS: CPT

## 2024-12-13 PROCEDURE — 73630 X-RAY EXAM OF FOOT: CPT | Mod: RT

## 2024-12-13 PROCEDURE — 3078F DIAST BP <80 MM HG: CPT | Performed by: INTERNAL MEDICINE

## 2024-12-13 PROCEDURE — 93880 EXTRACRANIAL BILAT STUDY: CPT

## 2024-12-13 PROCEDURE — 93978 VASCULAR STUDY: CPT

## 2024-12-13 PROCEDURE — EXAM4 EXAM 4: Performed by: INTERNAL MEDICINE

## 2024-12-13 RX ORDER — TADALAFIL 20 MG/1
20 TABLET ORAL DAILY PRN
Qty: 30 TABLET | Refills: 2 | Status: SHIPPED | OUTPATIENT
Start: 2024-12-13 | End: 2025-03-13

## 2024-12-13 RX ORDER — CYCLOBENZAPRINE HCL 5 MG
5 TABLET ORAL NIGHTLY
Qty: 30 TABLET | Refills: 2 | Status: SHIPPED | OUTPATIENT
Start: 2024-12-13

## 2024-12-13 RX ORDER — AZELASTINE 1 MG/ML
1 SPRAY, METERED NASAL 2 TIMES DAILY
Qty: 30 ML | Refills: 2 | Status: SHIPPED | OUTPATIENT
Start: 2024-12-13 | End: 2025-12-13

## 2024-12-13 RX ORDER — TADALAFIL 5 MG/1
5 TABLET ORAL DAILY PRN
Qty: 90 TABLET | Refills: 3 | Status: SHIPPED | OUTPATIENT
Start: 2024-12-13 | End: 2025-12-13

## 2024-12-13 ASSESSMENT — ENCOUNTER SYMPTOMS
ABDOMINAL PAIN: 0
SINUS PRESSURE: 0
GASTROINTESTINAL NEGATIVE: 1
FATIGUE: 0
HEMATURIA: 0
SORE THROAT: 0
MYALGIAS: 0
DIARRHEA: 0
NERVOUS/ANXIOUS: 0
PALPITATIONS: 0
CONSTIPATION: 0
POLYDIPSIA: 0
SHORTNESS OF BREATH: 0
VOMITING: 0
FLANK PAIN: 0
EYE REDNESS: 0
WHEEZING: 0
EYE PAIN: 0
CHEST TIGHTNESS: 0
HEMATOLOGIC/LYMPHATIC NEGATIVE: 1
FEVER: 0
DYSURIA: 0
BLOOD IN STOOL: 0
COUGH: 0
APNEA: 0
ALLERGIC/IMMUNOLOGIC NEGATIVE: 1
FREQUENCY: 0
BRUISES/BLEEDS EASILY: 0
JOINT SWELLING: 0
DIZZINESS: 0
CONFUSION: 0
ARTHRALGIAS: 0
NAUSEA: 0
ADENOPATHY: 0
SLEEP DISTURBANCE: 0
AGITATION: 0
HEADACHES: 0
DIFFICULTY URINATING: 0
TREMORS: 0
APPETITE CHANGE: 0
NUMBNESS: 0
NEUROLOGICAL NEGATIVE: 1
BACK PAIN: 0

## 2024-12-13 NOTE — PROGRESS NOTES
Executive Physical         Patient ID: German Oseguera is a 59 y.o. male who presents for Executive Evaluation:    The following report is in reference to your  executive physical examination which was held at [facility] on 12/13/24. Firstly, let me state that it was a pleasure meeting with you and that we appreciate you coming to [facility] for your executive evaluation.    At the time of your evaluation you were feeling well with no significant health concerns.    You were not complaining of fever ,chills, headache ,dizziness ,cough, chest pain shortness of breath, palpitations, nausea, vomiting, abdominal pain, loss of appetite, diarrhea, blood in the stools, frequent urination or painful urination.    Past Medical History:   Diagnosis Date    Acute upper respiratory infection, unspecified 03/26/2019    Acute URI    Nontoxic single thyroid nodule 09/07/2017    Left thyroid nodule    Pain in left foot 01/03/2017    Left foot pain    Personal history of other diseases of the musculoskeletal system and connective tissue 08/01/2013    History of low back pain    Personal history of other diseases of the respiratory system 08/24/2015    History of acute bronchitis    Right upper quadrant abdominal tenderness 08/26/2016    Right upper quadrant abdominal tenderness    Ventricular premature depolarization 03/16/2016    PVC's (premature ventricular contractions)         Review of Systems   Constitutional:  Negative for appetite change, fatigue and fever.   HENT:  Negative for congestion, ear discharge, ear pain, hearing loss, mouth sores, postnasal drip, sinus pressure, sore throat and tinnitus.    Eyes:  Negative for pain and redness.   Respiratory:  Negative for apnea, cough, chest tightness, shortness of breath and wheezing.    Cardiovascular:  Negative for chest pain, palpitations and leg swelling.   Gastrointestinal: Negative.  Negative for abdominal  pain, blood in stool, constipation, diarrhea, nausea and vomiting.   Endocrine: Negative for polydipsia and polyuria.   Genitourinary:  Negative for decreased urine volume, difficulty urinating, dysuria, enuresis, flank pain, frequency, hematuria, penile discharge, penile pain, scrotal swelling, testicular pain and urgency.   Musculoskeletal:  Negative for arthralgias, back pain, gait problem, joint swelling and myalgias.   Skin:  Negative for pallor and rash.   Allergic/Immunologic: Negative.    Neurological: Negative.  Negative for dizziness, tremors, syncope, numbness and headaches.   Hematological: Negative.  Negative for adenopathy. Does not bruise/bleed easily.   Psychiatric/Behavioral:  Negative for agitation, confusion and sleep disturbance. The patient is not nervous/anxious.    All other systems reviewed and are negative.          Patient Active Problem List   Diagnosis    Abnormal stress test    Benign essential hypertension    Conductive hearing loss of left ear with unrestricted hearing of right ear    Diarrhea    Erectile dysfunction    Eustachian tube disorder    Hyperlipidemia    Lung nodule, solitary    Osteoarthritis of knee    Palpitations    Varicose veins of left lower extremity with pain    Varicose veins of leg with pain    Vitamin D deficiency    Rotator cuff tear, left    Lesion of plantar nerve    Class 2 obesity        Past Surgical History:   Procedure Laterality Date    ABLATION OF DYSRHYTHMIC FOCUS  09/16/2017    Catheter Ablation    CT ANGIO CORONARY ART WITH HEARTFLOW IF SCORE >30%  11/1/2023    CT ANGIO CORONARY ART WITH HEARTFLOW IF SCORE >30% 11/1/2023 [facility]    KNEE ARTHROSCOPY W/ DEBRIDEMENT  09/24/2013    Knee Arthroscopy (Therapeutic)    OTHER SURGICAL HISTORY  06/21/2020    Colonoscopy    REFRACTIVE SURGERY  08/29/2017    Corneal LASIK    VASECTOMY  09/24/2013    Surgery Vas Deferens Vasectomy        Family History   Problem Relation Name Age of Onset    Hypothyroidism  Mother      Coronary artery disease Father      Hypertension Father      Thyroid disease Other Cousin     No Known Problems Sibling          No Known Allergies       Current Outpatient Medications:     ascorbic acid, vitamin C, 500 mg capsule, Take 1 capsule by mouth once daily., Disp: , Rfl:     atorvastatin (Lipitor) 20 mg tablet, Take 1 tablet (20 mg) by mouth once daily., Disp: 90 tablet, Rfl: 3    azelastine (Astelin) 137 mcg (0.1 %) nasal spray, Administer 1 spray into each nostril 2 times a day. Use in each nostril as directed, Disp: 30 mL, Rfl: 2    azelastine-fluticasone (Dymista) 137-50 mcg/spray nasal spray, Administer 1 spray into each nostril 2 times a day., Disp: , Rfl:     cholecalciferol (Vitamin D-3) 50 mcg (2,000 unit) capsule, Take 1 capsule (50 mcg) by mouth once daily., Disp: , Rfl:     cyclobenzaprine (Flexeril) 5 mg tablet, Take 1 tablet (5 mg) by mouth once daily at bedtime., Disp: , Rfl:     fluticasone (Flonase) 50 mcg/actuation nasal spray, Administer 1 spray into each nostril 2 times a day. Shake gently. Before first use, prime pump. After use, clean tip and replace cap., Disp: 16 g, Rfl: 1    glucosamine/chondr appiah A sod (glucosamine-chondroitin) 750-600 mg tablet tablet, Take 2 tablets by mouth once daily., Disp: , Rfl:     levocetirizine (Xyzal) 5 mg tablet, Take 1 tablet (5 mg) by mouth once daily in the evening for 14 days., Disp: 14 tablet, Rfl: 0    multivitamin tablet, Take 1 tablet by mouth once daily., Disp: , Rfl:     mupirocin (Bactroban) 2 % ointment, Apply topically., Disp: , Rfl:     olmesartan (BENIcar) 40 mg tablet, Take 1 tablet (40 mg) by mouth once daily., Disp: 90 tablet, Rfl: 3    tadalafil (Cialis) 20 mg tablet, Take 1 tablet (20 mg) by mouth once daily as needed for erectile dysfunction., Disp: 30 tablet, Rfl: 2    tadalafil (Cialis) 5 mg tablet, Take 1 tablet (5 mg) by mouth once daily as needed for erectile dysfunction., Disp: 90 tablet, Rfl: 3    tirzepatide  (Mounjaro) 15 mg/0.5 mL pen injector, Inject 15 mg under the skin 1 (one) time per week., Disp: 6 mL, Rfl: 3    tretinoin (Retin-A) 0.1 % cream, Apply topically., Disp: , Rfl:     Wixela Inhub 100-50 mcg/dose diskus inhaler, Inhale 1 puff 2 times a day. For 14 days, Disp: , Rfl:     zolpidem (Ambien) 10 mg tablet, Take by mouth., Disp: , Rfl:      Visit Vitals  /62   Pulse 60   Ht 1.829 m (6')   Wt 103 kg (228 lb)   SpO2 98%   BMI 30.92 kg/m²   Smoking Status Never   BSA 2.29 m²        Physical Exam  Vitals reviewed.   Constitutional:       Appearance: Normal appearance.   HENT:      Head: Normocephalic and atraumatic.      Right Ear: Tympanic membrane and ear canal normal.      Left Ear: Tympanic membrane and ear canal normal.      Nose: Nose normal.      Mouth/Throat:      Mouth: Mucous membranes are moist.   Eyes:      Extraocular Movements: Extraocular movements intact.      Conjunctiva/sclera: Conjunctivae normal.      Pupils: Pupils are equal, round, and reactive to light.   Cardiovascular:      Rate and Rhythm: Normal rate and regular rhythm.      Pulses: Normal pulses.      Heart sounds: Normal heart sounds. No murmur heard.     No friction rub. No gallop.   Pulmonary:      Effort: Pulmonary effort is normal. No respiratory distress.      Breath sounds: Normal breath sounds. No wheezing or rales.   Abdominal:      General: Abdomen is flat. Bowel sounds are normal. There is no distension.      Palpations: Abdomen is soft. There is no mass.      Tenderness: There is no abdominal tenderness. There is no guarding or rebound.      Hernia: No hernia is present.   Genitourinary:     Penis: Normal.       Testes: Normal.      Prostate: Normal.      Rectum: Normal. Guaiac result negative.   Musculoskeletal:         General: No swelling, tenderness or deformity. Normal range of motion.      Cervical back: Normal range of motion.   Skin:     General: Skin is warm.      Findings: No bruising, lesion or rash.    Neurological:      General: No focal deficit present.      Mental Status: He is alert and oriented to person, place, and time.      Sensory: No sensory deficit.      Motor: No weakness.      Coordination: Coordination normal.   Psychiatric:         Mood and Affect: Mood normal.         Behavior: Behavior normal.        Ancillary studies:    Vision screening-  Deferred    Bone density - Normal     Audiogram -Normal      Discussion/Summary  In summary you are 59 y.o. male with a past medical history of hyperlipidemia , hypertension and mild subclinical atherosclerosis.  At the time of your evaluation you were feeling well with no significant health concerns.    Physical examination including blood pressure and  cardiovascular  was unremarkable.Elevated  body mass index/ % body fat    Lab studies including complete blood count, comprehensive metabolic panel,  lipid panel, thyroid function, PSA , Vitamin D, Vitamin B12, magnesium, iron, uric acid ,insulin, Lp(a).  Testosterone ,HBA1C,   Hormonal markers and inflammatory markers were normal.      Cardiology- Normal  Carotid and Abdominal Aorta ultrasound studies.  Exercise stress test was deferred.    Elevated BMI-congratulations on your weight loss secondary to lifestyle changes and Tirzepatide 15  mg/week.  Continue with current lifestyle measures.  Increase protein/fish/fiber intake and limit processed/refined carbohydrates and added sugars. Increased physical activity to a minimum of 150 minutes of moderate exercise per week.     History of subclinical [id number] with [facility] program. Previous CT  angiogram in  2023 was unremarkable.  Clinically asymptomatic.    Hyperlipidemia-your cholesterol labs are in the normal range. Please continue with atorvastatin 20 mg daily.    ED/BPH-recommend Cialis 5 mg daily for BPH symptoms.  Cialis 20 mg as needed for ED.    Right foot pain-?  Degenerative arthritis,?  Tendinitis.  X-ray right foot with mild  arthritis.  Consider foot and ankle orthopedics consult for further evaluation.    Varicose veins with mild discomfort-consider vascular consult regarding treatment options for symptomatic varicose veins.  Keep legs elevated.  Compression socks as needed    Runny nose/rhinitis?  Allergic versus vasomotor rhinitis-.  Start azelastine nasal spray 1 spray per nostril twice a day.  Consider ENT consult if symptoms persist/worsen    Cancer screening- you are current with colonoscopy,prostate and lung cancer screening tests.    Skin - Please follow up with dermatology for annual examination. Monitor for any skin lesions( ugly duckling sign)  that are different in color, shape, or size than others on body. Recommend SPF 30+, hats with brims, sun protective clothing, and avoiding sun exposure between 10 AM and 2 PM whenever possible     Vaccine- I would  recommend a shingles (Shingrix) vaccine at your earliest convenience in addition I would recommend a tetanus booster every 10 years to maintain immunity.    Pneumonia vaccine (Prevnar 20) at age  65 Recommend RSV at  age 75 onwards. Consider COVID-19 booster and flu shot this  fall.      Wellness: Please continue with a balanced diet and a regular physical activity program for at least 150 minutes/week of moderate exercise and 30 minutes/week of resistance/weight training per week.  Try to get 6 to 8 hours of sleep per night.  Please download the Calm,  Headspace or Unwinding Anxiety  marivel from the [employer] Store to assist with stress and sleep management if necessary. Recommend 7 minute exercise Marivel for stretching and resistance training.    In conclusion,  I wish to thank you for attending [facility]  I wish you and your family a safe and healthy Holiday  season.    Please email me at [email or web address] or call me at [phone] if you have any questions pertaining to this report or any other medical concerns.    Be  Charbel Ibanez and Priti Rubio Master Clinician in Wellness    Senior Attending Physician , Primary Care [facility]    Clinical     [facility] [employer] [employer]    [place], [place]    This note was partially generated using the Dragon voice recognition system.  There may be some incorrect wording ,grammar, spelling or punctuation errors that were not corrected prior to committing the note to the medical record.

## 2024-12-14 ENCOUNTER — TELEPHONE (OUTPATIENT)
Facility: CLINIC | Age: 59
End: 2024-12-14
Payer: COMMERCIAL

## 2024-12-14 DIAGNOSIS — N40.1 BENIGN PROSTATIC HYPERPLASIA WITH URINARY FREQUENCY: Primary | ICD-10-CM

## 2024-12-14 DIAGNOSIS — R35.0 BENIGN PROSTATIC HYPERPLASIA WITH URINARY FREQUENCY: Primary | ICD-10-CM

## 2024-12-14 DIAGNOSIS — N52.9 ERECTILE DYSFUNCTION, UNSPECIFIED ERECTILE DYSFUNCTION TYPE: ICD-10-CM

## 2024-12-14 RX ORDER — TADALAFIL 20 MG/1
20 TABLET ORAL DAILY PRN
Qty: 30 TABLET | Refills: 2 | Status: CANCELLED | OUTPATIENT
Start: 2024-12-14 | End: 2025-03-14

## 2024-12-15 LAB
TESTOSTERONE FREE (CHAN): 63.8 PG/ML (ref 35–155)
TESTOSTERONE,TOTAL,LC-MS/MS: 585 NG/DL (ref 250–1100)

## 2024-12-19 PROCEDURE — 90471 IMMUNIZATION ADMIN: CPT | Performed by: INTERNAL MEDICINE

## 2024-12-19 PROCEDURE — 90656 IIV3 VACC NO PRSV 0.5 ML IM: CPT | Performed by: INTERNAL MEDICINE

## 2024-12-27 ENCOUNTER — PHARMACY VISIT (OUTPATIENT)
Dept: PHARMACY | Facility: CLINIC | Age: 59
End: 2024-12-27
Payer: MEDICARE

## 2024-12-27 PROCEDURE — RXMED WILLOW AMBULATORY MEDICATION CHARGE

## 2025-01-19 ENCOUNTER — TELEPHONE (OUTPATIENT)
Facility: CLINIC | Age: 60
End: 2025-01-19
Payer: COMMERCIAL

## 2025-01-19 DIAGNOSIS — J06.9 UPPER RESPIRATORY TRACT INFECTION, UNSPECIFIED TYPE: Primary | ICD-10-CM

## 2025-01-19 RX ORDER — AZITHROMYCIN 250 MG/1
TABLET, FILM COATED ORAL
Qty: 6 TABLET | Refills: 0 | Status: SHIPPED | OUTPATIENT
Start: 2025-01-19

## 2025-01-20 DIAGNOSIS — J32.9 SINUSITIS, UNSPECIFIED CHRONICITY, UNSPECIFIED LOCATION: Primary | ICD-10-CM

## 2025-01-20 RX ORDER — AMOXICILLIN AND CLAVULANATE POTASSIUM 875; 125 MG/1; MG/1
875 TABLET, FILM COATED ORAL 2 TIMES DAILY
Qty: 20 TABLET | Refills: 0 | Status: SHIPPED | OUTPATIENT
Start: 2025-01-20 | End: 2025-01-30

## 2025-01-20 RX ORDER — FLUTICASONE PROPIONATE 50 MCG
1 SPRAY, SUSPENSION (ML) NASAL 2 TIMES DAILY
Qty: 16 G | Refills: 11 | Status: SHIPPED | OUTPATIENT
Start: 2025-01-20 | End: 2026-01-20

## 2025-01-20 NOTE — TELEPHONE ENCOUNTER
Pt with Sinus congestion- No response with Afrin/Zpack- Will Try Augmentin Rx + Fluticisone Rx-Follow up if symptoms persist/worsen.

## 2025-01-27 PROCEDURE — RXMED WILLOW AMBULATORY MEDICATION CHARGE

## 2025-01-29 ENCOUNTER — PHARMACY VISIT (OUTPATIENT)
Dept: PHARMACY | Facility: CLINIC | Age: 60
End: 2025-01-29
Payer: MEDICARE

## 2025-02-19 NOTE — PROGRESS NOTES
Patient is sent at the request of Abdullahi Ca MD for my opinion regarding weight loss pharmacotherapy.  My final recommendations will be communicated back to the requesting provider by way of shared medical record.    Subjective     German Oseguera is a 59 y.o. year old male patient with class I obesity, hypertension, hyperlipidemia referred to [facility] for cardiometabolic risk factor optimization. He is currently prescribed tirzepatide (Mounjaro) at 15 mg once weekly in the setting of obesity. He completed his initial [facility] visit in November 2023 with a baseline BMI of 34. He is prescribed primary prevention with olmesartan 40 mg once daily and atorvastatin 20 mg once daily. He does not have a history of type two diabetes mellitus. He did not previously evidence proteinuria. He does not have a history of MI, stroke or PAD. He meets appropriate criteria for use of weight loss pharmacotherapy in the setting of class I obesity (BMI > 30 kg/m2) with additional risk factors.      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    Past Surgical History:  He has a past surgical history that includes Vasectomy (09/24/2013); Knee arthroscopy w/ debridement (09/24/2013); Refractive surgery (08/29/2017); Other surgical history (06/21/2020); Ablation of dysrhythmic focus (09/16/2017); and CT angio coronary art with heartflow if score >30% (11/1/2023).    Social History:  He reports that he has never smoked. He has never used smokeless tobacco. He reports current alcohol use of about 1.0  standard drink of alcohol per week. He reports that he does not use drugs.    Family History:  Family History   Problem Relation Name Age of Onset    Hypothyroidism Mother      Coronary artery disease Father      Hypertension Father      Thyroid disease Other Cousin     No Known Problems Sibling       Allergies:  Patient has no known allergies.    WEIGHT LOSS  Starting weight: 255 lbs. (11/15/2023)  Most recent office weight: 228 lb - BMI 30.92 kg/m2 (2024)  Current weight: 223-224 lbs.    Lifestyle  Recently retired 2024   Has worked with nutritionist/dietician? Yes - Currently following with functional nutrition  Exercise: exercises at least 3 times a week  Activity type: Type of Exercise : aerobic conditioning  and strength training  Is limited by: None    Other Potential Contributing Factors  Alcohol use: Average 1-2 drinks/week  Tobacco use: No  Other drug use: No  Medications that may cause weight gain: none  Mental health: No current concerns  Eating Disorders: Denies Hx of any eating disorder  Comorbidities: Lipid disorder     Pertinent PMH Review:  PMH of Pancreatitis: No  PMH of Retinopathy: No  PMH of MTC: No  PMH of MEN2: No    Non-Pharmacological Therapy  Weight loss techniques attempted:  Self directed dieting    Pharmacological Therapy  Current Medications: Mounjaro 15 mg: Inject 15 mg under the skin once weekly on    Doses remainin-2  Adverse Effects: No reported adverse effects  Previous Medications: None     Insurance coverage of weight-loss medications? Yes  Eligible for copay cards/programs? Yes    Medication Estimated Cost Expected weight loss Patient Risks/Cautions   Wegovy (semaglutide) ~$650/month w/ savings card 10-20% None     Zepbound (tirzepatide) $650/month   w/ savings card 10-20%      Objective     Last Recorded Vitals:  BP Readings from Last 6 Encounters:   24 118/62   24 112/76   24 120/80   11/15/23 149/88   23 110/64   10/31/23  "128/76      Wt Readings from Last 6 Encounters:   12/13/24 103 kg (228 lb)   07/23/24 104 kg (229 lb 4.5 oz)   06/21/24 104 kg (230 lb)   11/15/23 116 kg (255 lb)   10/31/23 121 kg (267 lb)   02/23/23 116 kg (255 lb)     BMI Readings from Last 6 Encounters:   12/13/24 30.92 kg/m²   07/23/24 31.40 kg/m²   06/21/24 31.19 kg/m²   11/15/23 34.58 kg/m²   10/31/23 36.21 kg/m²   02/23/23 34.58 kg/m²     Lab Review  Lab Results   Component Value Date    BILITOT 0.8 12/11/2024    CALCIUM 9.4 12/11/2024    CO2 30 12/11/2024     12/11/2024    CREATININE 0.91 12/11/2024    GLUCOSE 97 12/11/2024    ALKPHOS 58 12/11/2024    K 5.1 12/11/2024    PROT 7.3 12/11/2024     12/11/2024    AST 21 12/11/2024    ALT 27 12/11/2024    BUN 16 12/11/2024    ANIONGAP 10 12/11/2024    MG 2.21 12/11/2024    ALBUMIN 4.7 12/11/2024    GFRMALE >90 08/18/2022     Lab Results   Component Value Date    TRIG 46 12/11/2024    CHOL 137 12/11/2024    LDLCALC 60 12/11/2024    HDL 67.7 12/11/2024     Lab Results   Component Value Date    HGBA1C 4.6 12/11/2024    HGBA1C 4.5 06/18/2024    HGBA1C 4.9 02/19/2024     No components found for: \"UACR\"    The 10-year ASCVD risk score (Jarod SMITH, et al., 2019) is: 5.5%    Values used to calculate the score:      Age: 59 years      Sex: Male      Is Non- : No      Diabetic: No      Tobacco smoker: No      Systolic Blood Pressure: 118 mmHg      Is BP treated: Yes      HDL Cholesterol: 67.7 mg/dL      Total Cholesterol: 137 mg/dL    Assessment/Plan   Problem List Items Addressed This Visit       Class 2 obesity     Current regimen includes Mounjaro 15 mg once weekly. Patient's current weight reported as 223-224 lb. Has lost 21 lbs (8% of TBW) since starting therapy plan. Advised to continue current regimen of Mounjaro 15 mg. Discussed with patient the off-label use of once-weekly Mounjaro  which is indicated and approved by the FDA for Type Two Diabetes Mellitus treatment. Medication " cost and coverage for this agent may change at any time as determined by your primary insurance carrier. Therapy with Zepbound is appropriate in the setting of class I obesity. He is currently meeting with a nutritionist. He reports regular physical activity in the form of aerobic exercise and weight resistance training. Follow up in four months.          Relevant Medications    tirzepatide (Mounjaro) 15 mg/0.5 mL pen injector (Start on 2/23/2025)    tirzepatide (Mounjaro) 15 mg/0.5 mL pen injector    Other Relevant Orders    Referral to Clinical Pharmacy     Other Visit Diagnoses       Lipid disorder        Relevant Medications    atorvastatin (Lipitor) 20 mg tablet          Compliance at present is estimated to be excellent.   Follow-up: 06/26/2025 at 9 am   PCP Follow-Up: Not scheduled     Continue all meds under the continuation of care with the referring provider and clinical pharmacy team.

## 2025-02-20 ENCOUNTER — APPOINTMENT (OUTPATIENT)
Dept: PHARMACY | Facility: HOSPITAL | Age: 60
End: 2025-02-20
Payer: COMMERCIAL

## 2025-02-20 DIAGNOSIS — E78.9 LIPID DISORDER: ICD-10-CM

## 2025-02-20 DIAGNOSIS — E66.812 CLASS 2 OBESITY: ICD-10-CM

## 2025-02-20 PROCEDURE — RXMED WILLOW AMBULATORY MEDICATION CHARGE

## 2025-02-20 RX ORDER — TIRZEPATIDE 15 MG/.5ML
15 INJECTION, SOLUTION SUBCUTANEOUS WEEKLY
Qty: 6 ML | Refills: 3 | Status: SHIPPED | OUTPATIENT
Start: 2025-02-20

## 2025-02-20 RX ORDER — TIRZEPATIDE 15 MG/.5ML
15 INJECTION, SOLUTION SUBCUTANEOUS
Qty: 6 ML | Refills: 3 | Status: SHIPPED | OUTPATIENT
Start: 2025-02-23

## 2025-02-20 RX ORDER — TIRZEPATIDE 15 MG/.5ML
15 INJECTION, SOLUTION SUBCUTANEOUS WEEKLY
Qty: 6 ML | Refills: 3 | Status: CANCELLED | OUTPATIENT
Start: 2025-02-20

## 2025-02-20 RX ORDER — ATORVASTATIN CALCIUM 20 MG/1
20 TABLET, FILM COATED ORAL DAILY
Qty: 90 TABLET | Refills: 3 | Status: SHIPPED | OUTPATIENT
Start: 2025-02-20 | End: 2026-02-20

## 2025-02-20 RX ORDER — OLMESARTAN MEDOXOMIL 20 MG/1
20 TABLET ORAL DAILY
COMMUNITY

## 2025-02-20 NOTE — ASSESSMENT & PLAN NOTE
Current regimen includes Mounjaro 15 mg once weekly. Patient's current weight reported as 223-224 lb. Has lost 21 lbs (8% of TBW) since starting therapy plan. Advised to continue current regimen of Mounjaro 15 mg. Discussed with patient the off-label use of once-weekly Mounjaro  which is indicated and approved by the FDA for Type Two Diabetes Mellitus treatment. Medication cost and coverage for this agent may change at any time as determined by your primary insurance carrier. Therapy with Zepbound is appropriate in the setting of class I obesity. He is currently meeting with a nutritionist. He reports regular physical activity in the form of aerobic exercise and weight resistance training. Follow up in four months.

## 2025-02-22 ENCOUNTER — TELEPHONE (OUTPATIENT)
Dept: PRIMARY CARE | Facility: CLINIC | Age: 60
End: 2025-02-22
Payer: COMMERCIAL

## 2025-02-22 DIAGNOSIS — M54.6 ACUTE MIDLINE THORACIC BACK PAIN: Primary | ICD-10-CM

## 2025-02-22 RX ORDER — MELOXICAM 15 MG/1
15 TABLET ORAL DAILY
Qty: 30 TABLET | Refills: 2 | Status: SHIPPED | OUTPATIENT
Start: 2025-02-22 | End: 2026-02-22

## 2025-02-24 ENCOUNTER — PHARMACY VISIT (OUTPATIENT)
Dept: PHARMACY | Facility: CLINIC | Age: 60
End: 2025-02-24
Payer: MEDICARE

## 2025-02-26 ENCOUNTER — TELEPHONE (OUTPATIENT)
Dept: PRIMARY CARE | Facility: CLINIC | Age: 60
End: 2025-02-26
Payer: COMMERCIAL

## 2025-02-26 DIAGNOSIS — M54.50 LOW BACK PAIN, UNSPECIFIED BACK PAIN LATERALITY, UNSPECIFIED CHRONICITY, UNSPECIFIED WHETHER SCIATICA PRESENT: Primary | ICD-10-CM

## 2025-02-26 RX ORDER — CYCLOBENZAPRINE HCL 10 MG
10 TABLET ORAL NIGHTLY PRN
Qty: 30 TABLET | Refills: 0 | Status: SHIPPED | OUTPATIENT
Start: 2025-02-26 | End: 2025-04-27

## 2025-02-26 NOTE — TELEPHONE ENCOUNTER
"T/c re fall last week and lower back pain-feels like \"whiplash \" of the lower back.He has tried Meloxicam 15 mg daily. Start Flexeril 10 mg Qhs/PRN. Tylenol PRN. Consider PT/MT/DC if symproms persist/worsen.   "

## 2025-03-25 DIAGNOSIS — M54.50 LOW BACK PAIN, UNSPECIFIED BACK PAIN LATERALITY, UNSPECIFIED CHRONICITY, UNSPECIFIED WHETHER SCIATICA PRESENT: ICD-10-CM

## 2025-03-25 RX ORDER — CYCLOBENZAPRINE HCL 10 MG
10 TABLET ORAL NIGHTLY PRN
Qty: 30 TABLET | Refills: 0 | Status: SHIPPED | OUTPATIENT
Start: 2025-03-25 | End: 2025-05-24

## 2025-04-01 PROCEDURE — RXMED WILLOW AMBULATORY MEDICATION CHARGE

## 2025-04-03 ENCOUNTER — PHARMACY VISIT (OUTPATIENT)
Dept: PHARMACY | Facility: CLINIC | Age: 60
End: 2025-04-03
Payer: MEDICARE

## 2025-04-24 PROCEDURE — RXMED WILLOW AMBULATORY MEDICATION CHARGE

## 2025-04-29 ENCOUNTER — PHARMACY VISIT (OUTPATIENT)
Dept: PHARMACY | Facility: CLINIC | Age: 60
End: 2025-04-29
Payer: MEDICARE

## 2025-05-02 DIAGNOSIS — M54.50 LOW BACK PAIN, UNSPECIFIED BACK PAIN LATERALITY, UNSPECIFIED CHRONICITY, UNSPECIFIED WHETHER SCIATICA PRESENT: ICD-10-CM

## 2025-05-02 RX ORDER — CYCLOBENZAPRINE HCL 10 MG
10 TABLET ORAL NIGHTLY PRN
Qty: 30 TABLET | Refills: 2 | Status: SHIPPED | OUTPATIENT
Start: 2025-05-02 | End: 2025-07-01

## 2025-05-22 PROCEDURE — RXMED WILLOW AMBULATORY MEDICATION CHARGE

## 2025-05-23 ENCOUNTER — PHARMACY VISIT (OUTPATIENT)
Dept: PHARMACY | Facility: CLINIC | Age: 60
End: 2025-05-23
Payer: MEDICARE

## 2025-05-27 DIAGNOSIS — M54.6 ACUTE MIDLINE THORACIC BACK PAIN: ICD-10-CM

## 2025-05-27 RX ORDER — MELOXICAM 15 MG/1
15 TABLET ORAL DAILY
Qty: 90 TABLET | Refills: 1 | Status: SHIPPED | OUTPATIENT
Start: 2025-05-27 | End: 2025-11-23

## 2025-06-19 PROCEDURE — RXMED WILLOW AMBULATORY MEDICATION CHARGE

## 2025-06-20 ENCOUNTER — PHARMACY VISIT (OUTPATIENT)
Dept: PHARMACY | Facility: CLINIC | Age: 60
End: 2025-06-20
Payer: MEDICARE

## 2025-06-26 ENCOUNTER — APPOINTMENT (OUTPATIENT)
Dept: PHARMACY | Facility: HOSPITAL | Age: 60
End: 2025-06-26
Payer: COMMERCIAL

## 2025-06-26 DIAGNOSIS — I10 BENIGN ESSENTIAL HYPERTENSION: ICD-10-CM

## 2025-06-26 DIAGNOSIS — E78.5 HYPERLIPIDEMIA, UNSPECIFIED HYPERLIPIDEMIA TYPE: Primary | ICD-10-CM

## 2025-06-26 DIAGNOSIS — E66.812 CLASS 2 OBESITY: ICD-10-CM

## 2025-06-26 NOTE — PROGRESS NOTES
Patient is sent at the request of Abdullahi Ca MD for my opinion regarding weight loss pharmacotherapy.  My final recommendations will be communicated back to the requesting provider by way of shared medical record.    Subjective     German Oseguera is a 59 y.o. year old male patient with class I obesity, hypertension, hyperlipidemia referred to [facility] for cardiometabolic risk factor optimization. He is currently prescribed tirzepatide (Mounjaro) at 15 mg once weekly in the setting of obesity. He completed his initial [facility] visit in November 2023 with a baseline BMI of 34. He is prescribed primary prevention with olmesartan 40 mg once daily and atorvastatin 20 mg once daily. He does not have a history of type two diabetes mellitus. He did not previously evidence proteinuria. He does not have a history of MI, stroke or PAD. He meets appropriate criteria for use of weight loss pharmacotherapy in the setting of class I obesity (BMI > 30 kg/m2) with additional risk factors.      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    Past Surgical History:  He has a past surgical history that includes Vasectomy (09/24/2013); Knee arthroscopy w/ debridement (09/24/2013); Refractive surgery (08/29/2017); Other surgical history (06/21/2020); Ablation of dysrhythmic focus (09/16/2017); and CT angio coronary art with heartflow if score >30% (11/1/2023).    Social History:  He reports that he has never smoked. He has never used smokeless tobacco. He reports current alcohol use of about 1.0  standard drink of alcohol per week. He reports that he does not use drugs.    Family History:  Family History   Problem Relation Name Age of Onset    Hypothyroidism Mother      Coronary artery disease Father      Hypertension Father      Thyroid disease Other Cousin     No Known Problems Sibling       Allergies:  Patient has no known allergies.    WEIGHT LOSS  Starting weight: 255 lbs. (11/15/2023)  Most recent office weight: 228 lb - BMI 30.92 kg/m2 (12/13/2024)  Last TH weight: 223-224 lbs.  Current TH weight: 225-226 lbs.    Lifestyle  Retired December 2024   At the time of our encounter he is walking   He notes that he has been traveling frequently  Has worked with nutritionist/dietician? Not currently, last visit >3 months ago  Exercise: exercises 3 times a week  Activity type: Aerobic conditioning  and strength training  Estimates that 80% of the time that the is able to maintain three days weekly with strength training  Is limited by: None    Other Potential Contributing Factors  Alcohol use: Average 1-2 drinks/week  Tobacco use: No  Other drug use: No  Medications that may cause weight gain: none  Mental health: No current concerns  Eating Disorders: Denies Hx of any eating disorder  Comorbidities: Lipid disorder     Pertinent PMH Review:  PMH of Pancreatitis: No  PMH of Retinopathy: No  PMH of MTC: No  PMH of MEN2: No    Non-Pharmacological Therapy  Weight loss techniques attempted:  Self directed dieting    Pharmacological Therapy  Current Medications: Mounjaro 15 mg: Inject 15 mg under the skin once weekly on Saturdays   Adverse Effects: No reported adverse effects  Previous Medications: None     Insurance coverage of weight-loss medications? Yes  Eligible for copay cards/programs? Yes    Medication Estimated Cost Expected weight loss Patient Risks/Cautions   Wegovy (semaglutide) ~$650/month w/ savings card 10-20% None     Zepbound (tirzepatide) $650/month   w/ savings card 10-20%      Objective     Last  "Recorded Vitals:  BP Readings from Last 6 Encounters:   12/13/24 118/62   06/21/24 112/76   06/17/24 120/80   11/15/23 149/88   11/01/23 110/64   10/31/23 128/76      Wt Readings from Last 6 Encounters:   12/13/24 103 kg (228 lb)   07/23/24 104 kg (229 lb 4.5 oz)   06/21/24 104 kg (230 lb)   11/15/23 116 kg (255 lb)   10/31/23 121 kg (267 lb)   02/23/23 116 kg (255 lb)     BMI Readings from Last 6 Encounters:   12/13/24 30.92 kg/m²   07/23/24 31.40 kg/m²   06/21/24 31.19 kg/m²   11/15/23 34.58 kg/m²   10/31/23 36.21 kg/m²   02/23/23 34.58 kg/m²     Lab Review  Lab Results   Component Value Date    BILITOT 0.8 12/11/2024    CALCIUM 9.4 12/11/2024    CO2 30 12/11/2024     12/11/2024    CREATININE 0.91 12/11/2024    GLUCOSE 97 12/11/2024    ALKPHOS 58 12/11/2024    K 5.1 12/11/2024    PROT 7.3 12/11/2024     12/11/2024    AST 21 12/11/2024    ALT 27 12/11/2024    BUN 16 12/11/2024    ANIONGAP 10 12/11/2024    MG 2.21 12/11/2024    ALBUMIN 4.7 12/11/2024    GFRMALE >90 08/18/2022     Lab Results   Component Value Date    TRIG 46 12/11/2024    CHOL 137 12/11/2024    LDLCALC 60 12/11/2024    HDL 67.7 12/11/2024     Lab Results   Component Value Date    HGBA1C 4.6 12/11/2024    HGBA1C 4.5 06/18/2024    HGBA1C 4.9 02/19/2024     No components found for: \"UACR\"    The 10-year ASCVD risk score (Jarod DK, et al., 2019) is: 5.5%    Values used to calculate the score:      Age: 59 years      Sex: Male      Is Non- : No      Diabetic: No      Tobacco smoker: No      Systolic Blood Pressure: 118 mmHg      Is BP treated: Yes      HDL Cholesterol: 67.7 mg/dL      Total Cholesterol: 137 mg/dL    Assessment/Plan   Problem List Items Addressed This Visit       Class 2 obesity    Current regimen includes Mounjaro 15 mg once weekly. Patient's current weight reported as 225-226, 1-2 lb increase from last visit. Advised to continue current regimen of Mounjaro 15 mg. Discussed with patient the off-label " use of once-weekly Mounjaro which is indicated and approved by the FDA for Type Two Diabetes Mellitus treatment. Medication cost and coverage for this agent may change at any time as determined by your primary insurance carrier. Therapy with Zepbound is appropriate in the setting of class I obesity. He reports regular physical activity in the form of aerobic exercise and weight resistance training. He noted that he fell off track with his exercise habits during periods of travel. He is working on implementing and refocusing his exercise patterns. Follow up in 6 months.         Relevant Orders    Referral to Clinical Pharmacy     Compliance at present is estimated to be excellent.   Follow-up: 06/26/2025 at 9 am   Cardiology follow-up: 7/11/2025    Continue all meds under the continuation of care with the referring provider and clinical pharmacy team.

## 2025-06-26 NOTE — ASSESSMENT & PLAN NOTE
Current regimen includes Mounjaro 15 mg once weekly. Patient's current weight reported as 225-226, 1-2 lb increase from last visit. Advised to continue current regimen of Mounjaro 15 mg. Discussed with patient the off-label use of once-weekly Mounjaro which is indicated and approved by the FDA for Type Two Diabetes Mellitus treatment. Medication cost and coverage for this agent may change at any time as determined by your primary insurance carrier. Therapy with Zepbound is appropriate in the setting of class I obesity. He reports regular physical activity in the form of aerobic exercise and weight resistance training. He noted that he fell off track with his exercise habits during periods of travel. He is working on implementing and refocusing his exercise patterns. Follow up in 6 months.

## 2025-07-08 ENCOUNTER — TELEPHONE (OUTPATIENT)
Dept: CARDIOLOGY | Facility: HOSPITAL | Age: 60
End: 2025-07-08
Payer: COMMERCIAL

## 2025-07-10 LAB
ALBUMIN SERPL-MCNC: 4.4 G/DL (ref 3.6–5.1)
ALBUMIN/CREAT UR: 3 MG/G CREAT
ALP SERPL-CCNC: 63 U/L (ref 35–144)
ALT SERPL-CCNC: 21 U/L (ref 9–46)
ANION GAP SERPL CALCULATED.4IONS-SCNC: 6 MMOL/L (CALC) (ref 7–17)
AST SERPL-CCNC: 18 U/L (ref 10–35)
BASOPHILS # BLD AUTO: 39 CELLS/UL (ref 0–200)
BASOPHILS NFR BLD AUTO: 1 %
BILIRUB SERPL-MCNC: 0.7 MG/DL (ref 0.2–1.2)
BUN SERPL-MCNC: 21 MG/DL (ref 7–25)
CALCIUM SERPL-MCNC: 8.9 MG/DL (ref 8.6–10.3)
CHLORIDE SERPL-SCNC: 107 MMOL/L (ref 98–110)
CHOLEST SERPL-MCNC: 118 MG/DL
CHOLEST/HDLC SERPL: 2 (CALC)
CO2 SERPL-SCNC: 28 MMOL/L (ref 20–32)
CREAT SERPL-MCNC: 0.77 MG/DL (ref 0.7–1.35)
CREAT UR-MCNC: 238 MG/DL (ref 20–320)
EGFRCR SERPLBLD CKD-EPI 2021: 102 ML/MIN/1.73M2
EOSINOPHIL # BLD AUTO: 222 CELLS/UL (ref 15–500)
EOSINOPHIL NFR BLD AUTO: 5.7 %
ERYTHROCYTE [DISTWIDTH] IN BLOOD BY AUTOMATED COUNT: 13.5 % (ref 11–15)
EST. AVERAGE GLUCOSE BLD GHB EST-MCNC: 97 MG/DL
EST. AVERAGE GLUCOSE BLD GHB EST-SCNC: 5.4 MMOL/L
GLUCOSE SERPL-MCNC: 82 MG/DL (ref 65–99)
HBA1C MFR BLD: 5 %
HCT VFR BLD AUTO: 43.4 % (ref 38.5–50)
HDL SERPL QN: NORMAL NM
HDL SERPL-SCNC: NORMAL NMOL/L
HDLC SERPL-MCNC: 60 MG/DL
HGB BLD-MCNC: 13.9 G/DL (ref 13.2–17.1)
HLD.LARGE SERPL-SCNC: NORMAL UMOL/L
LDL SERPL QN: NORMAL
LDL SERPL-SCNC: NORMAL NMOL/L
LDL SMALL SERPL-SCNC: NORMAL NMOL/L
LDLC SERPL CALC-MCNC: 48 MG/DL (CALC)
LYMPHOCYTES # BLD AUTO: 1248 CELLS/UL (ref 850–3900)
LYMPHOCYTES NFR BLD AUTO: 32 %
MCH RBC QN AUTO: 30.8 PG (ref 27–33)
MCHC RBC AUTO-ENTMCNC: 32 G/DL (ref 32–36)
MCV RBC AUTO: 96.2 FL (ref 80–100)
MICROALBUMIN UR-MCNC: 0.6 MG/DL
MONOCYTES # BLD AUTO: 421 CELLS/UL (ref 200–950)
MONOCYTES NFR BLD AUTO: 10.8 %
NEUTROPHILS # BLD AUTO: 1970 CELLS/UL (ref 1500–7800)
NEUTROPHILS NFR BLD AUTO: 50.5 %
NONHDLC SERPL-MCNC: 58 MG/DL (CALC)
PLATELET # BLD AUTO: 159 THOUSAND/UL (ref 140–400)
PMV BLD REES-ECKER: 11.7 FL (ref 7.5–12.5)
POTASSIUM SERPL-SCNC: 4.7 MMOL/L (ref 3.5–5.3)
PROT SERPL-MCNC: 6.8 G/DL (ref 6.1–8.1)
RBC # BLD AUTO: 4.51 MILLION/UL (ref 4.2–5.8)
SODIUM SERPL-SCNC: 141 MMOL/L (ref 135–146)
TRIGL SERPL-MCNC: 34 MG/DL
VLDL LARGE SERPL-SCNC: NORMAL
VLDL SERPL QN: NORMAL NM
WBC # BLD AUTO: 3.9 THOUSAND/UL (ref 3.8–10.8)

## 2025-07-10 NOTE — PROGRESS NOTES
[facility] (Formerly [facility], [facility])      Reason for Visit  FOLLOW UP  VISIT Formerly [facility], [facility]    PROBLEM LIST    Mild CAD with luminal irregularities in the LAD with abnormal FFR in the distal portion (may represent artifact).  CAC score of 0.3.  Stress test on 10/31/2023 was abnormal with 2 mm downsloping ST depression and PACs and PVCs.  Total exercise capacity was normal [12.5 METS]  Class 1 Obesity with mild Steatosis on CT. BMI 10/23 was 34.5  Mild Hyperlipidemia. NMR lipoprotein profile revealed small LDL particles of 426 on 1/10/2022.  LDL-C 48; TG of 34; HDL of 60.  On the lipid profile his total cholesterol was 118, HDL of 60, triglycerides of 34 and LDL cholesterol 48.  Most recent hemoglobin A1c was 5.0.  Frequent PVCs on Exercise test in 2023.    Assessment & Plan  Mild coronary artery disease    A previous CT showed minimal luminal irregularities in the LAD and RCA with CAC of 0.3 and an abnormal FFR value.  Positive ETT with dosnsloping ST changes at peak exercise. Continue current regimen. LDL of 48 LDL-P pending.   Monitor cardiovascular health with regular follow-ups. Order an echocardiogram to assess for any changes in ventricular function. Reassess GLP-1 therapy in 2-3 years or if significant metabolic adaptation is achieved.      2. Insulin Resistance. HbA1c 5.0. Current weight loss and exercise regimen have likely contributed to this improvement. Concerns about long-term GLP-1 agonists use were addressed, noting that skeletal muscle loss is adaptive, reducing fat deposition in skeletal muscles and improving insulin sensitivity. Improved mental clarity and sleep quality may be associated with a plant-based diet and exercise. The potential future use of next-generation GLP-1 and triagonists was discussed, but there is insufficient evidence on long-term cardiovascular effects. He is advised to continue the current treatment plan with reassessment in 2-3 years for metabolic  adaptation and potential medication tapering. He is aware of potential weight regain if GLP-1 therapy is discontinued, with studies showing 80-85% regain within a year. He is encouraged to continue to maintain his high level of aerobic fitness and  discipline to counteract potential weight regain. Continue the current exercise regimen and dietary modifications.    Hypertension    Goal SBP of <130 mm Hg. Blood pressure is well-controlled (<130 mm Hg) with the current medication regimen, with a recent reading of 111/71 mmHg. Ensure adequate hydration.    Recording duration: 32 minutes     Follow up in 12 months.       History of Present Illness  German Oseguera is a 59 year old male who presents for a follow-up on weight management and cardiovascular health.    He is actively engaging in a structured exercise regimen, including strength training three days a week and aerobic exercises such as using the elliptical three to four days a week. Additionally, he plays golf three to four times a week. He feels 'pretty active' and 'really good' with no symptoms of shortness of breath.    His diet has improved significantly, with a reduction in meat consumption to twice a month from four to five times a week. He maintains a balanced diet but occasionally indulges in pizza. He reports greater mental clarity, which he attributes to lifestyle changes, including custodial and dietary adjustments.    He has experienced significant weight loss, dropping from 267 pounds to 228 pounds, with a slight regain to 232 pounds. He attributes this to lifestyle changes and medication use. He was previously at his maximum weight in early 2023. He is currently on a GLP-1 agonist for weight management, which he feels has helped him maintain weight loss and improve satiety.    He is taking atorvastatin and olmesartan as part of his ongoing medication regimen. He reports occasional dizziness, particularly when standing up, and is monitoring his  blood pressure at home, which typically ranges from 110 to 125 systolic.    He acknowledges a history of inadequate hydration, often not drinking enough water, which he is aware needs improvement.    His father passed away at 47 from a heart issue, which weighs heavily on him, although his father was not a regular smoker.     CTA Coronaries 2023  1. Minimal luminal irregularities noted in the proximal to mid LAD  and proximal RCA.  2. No evidence of obstructive coronary stenosis.  3. Coronary calcium score of 0.3.  4. CT scan was sent for the duration of noninvasive fractional flow  reserve using Heart Flow technology. These results will be updated  upon receipt of the FFR results.  5. Diffuse low-attenuation attenuation of the liver that is  consistent with mild hepatic steatosis.    Carotid USG    Right Carotid: Findings are consistent with less than 50% stenosis of the right proximal ICA. Laminar flow seen by color Doppler.  Left Carotid: Findings are consistent with less than 50% stenosis of the left proximal ICA. Laminar flow seen by color Doppler.  Comparison:  Compared with study from 6/19/2020, no significant change.  Additional Findings:     Imaging & Doppler Findings:  Right Plaque Morph: No plaque identified in the right carotid artery.  Left Plaque Morph: No plaque identified in the left carotid artery.  Intimal thickening noted in 2017   Right                 Left    PSV     EDV           PSV     EDV  63 cm/s 12 cm/s ICA P 61 cm/s 10 cm/s      CMR 2014  1. The left ventricle is normal in size, shape, and has normal global   systolic function.  There are no segmental wall motion abnormalities.    Quantitative values are as noted above.  2. There are no findings to suggest prior ischemic damage or an   infiltrative process.  3. Normal aortic, mitral, and tricuspid valve function.  4. Ascending aorta at the upper limit of normal measuring 3.8 cm. The   rest of the thoracic aorta appears normal in course,  caliber, and   contour.     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    Past Surgical History:  He has a past surgical history that includes Vasectomy (09/24/2013); Knee arthroscopy w/ debridement (09/24/2013); Refractive surgery (08/29/2017); Other surgical history (06/21/2020); Ablation of dysrhythmic focus (09/16/2017); and CT angio coronary art with heartflow if score >30% (11/1/2023).    Social History:  He reports that he has never smoked. He has never used smokeless tobacco. He reports current alcohol use of about 1.0 standard drink of alcohol per week. He reports that he does not use drugs.  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    He does not smoke and is a social drinker.    Outpatient Medications:  Current Outpatient Medications   Medication Instructions    ascorbic acid, vitamin C, 500 mg capsule 1 capsule, Daily    atorvastatin (LIPITOR) 20 mg, oral, Daily    azelastine (Astelin) 137 mcg (0.1 %) nasal spray 1 spray, Each Nostril, 2 times daily, Use in each nostril as directed    azelastine-fluticasone (Dymista) 137-50 mcg/spray nasal spray 1 spray, 2 times daily    cholecalciferol (Vitamin D-3) 50 mcg (2,000 unit) capsule 1 capsule, Daily    cyclobenzaprine (FLEXERIL) 10 mg, oral, Nightly PRN    Fish OiL 60- mg capsule 2,000 mg, Daily    fluticasone (Flonase) 50 mcg/actuation nasal spray 1 spray, Each Nostril, 2 times daily, Shake gently. Before first use, prime pump. After use, clean tip and  replace cap.    glucosamine/chondr appiah A sod (glucosamine-chondroitin) 750-600 mg tablet tablet 2 tablets, Daily    GLUTATHIONE ORAL 1 capsule, 2 times daily    Lactobacillus acidophilus (PROBIOTIC ORAL) 1 capsule, Nightly    levocetirizine (XYZAL) 5 mg, oral, Every evening    MAGNESIUM GLYCINATE ORAL 3 capsules, Nightly    MELATONIN ORAL 1 tablet, Nightly    meloxicam (MOBIC) 15 mg, oral, Daily    Mounjaro 15 mg, subcutaneous, Once Weekly    Mounjaro 15 mg, subcutaneous, Weekly    multivit-min/lycop/lut/xrzo276 (PHYTOMULTI ORAL) 2 capsules, Daily    multivitamin tablet 1 tablet, Daily    olmesartan (BENICAR) 20 mg, Daily    POTASSIUM CITRATE ORAL 2 capsules, 2 times daily    PSYLLIUM HUSK ORAL 500 mg, Once daily (morning) M-F (5 days a week)    SODIUM BUTYRATE, BULK, MISC 1 capsule, 3 times daily    tadalafil (CIALIS) 5 mg, oral, Daily PRN    tadalafil 20 mg, oral, Daily PRN    tretinoin (Retin-A) 0.1 % cream Apply topically.    ZINC PICOLINATE, BULK, MISC 30 mg, 2 times daily       Last Recorded Vitals:  Vitals:    07/11/25 0821   BP: 111/71   BP Location: Left arm   Patient Position: Sitting   BP Cuff Size: Adult   Pulse: 59   Resp: 18   SpO2: 98%   Weight: 105 kg (232 lb 1.6 oz)   Height: 1.829 m (6')     Body mass index is 31.48 kg/m².     Physical Examination:  Physical Exam  VITALS: BP- 111/71  MEASUREMENTS: Weight- 232 lbs.  NECK: No carotid bruits.  CARDIOVASCULAR: Normal heart rate and rhythm, S1 and S2 normal without murmurs.  ABDOMEN: Abdomen normal, soft, non-tender, non-distended, without organomegaly.  EXTREMITIES: Extremities normal, no cyanosis or edema.     Results  LABS  Lipid profile: LDL < 50 mg/dL, HDL 60 mg/dL, Triglycerides 34 mg/dL  HbA1c: 5%    RADIOLOGY  CT FFR: Abnormal fractional flow reserve (FFR) value, changes in blood flow to the distal portion of one artery, no major blockages, some plaque buildup, minimal calcium score (2023)  Liver CT: Fatty deposition (2023)    DIAGNOSTIC  EKG:  "Abnormal (2023)  Stress test: Abnormal (2023)       Laboratory Studies:  Lab Results   Component Value Date    GLUCOSE 82 07/09/2025    CALCIUM 8.9 07/09/2025     07/09/2025    K 4.7 07/09/2025    CO2 28 07/09/2025     07/09/2025    BUN 21 07/09/2025    CREATININE 0.77 07/09/2025     Lab Results   Component Value Date    CHOL 118 07/09/2025    CHOL 137 12/11/2024    CHOL 105 06/18/2024     Lab Results   Component Value Date    HDL 60 07/09/2025    HDL 67.7 12/11/2024    HDL 48.3 06/18/2024     Lab Results   Component Value Date    LDLCALC 48 07/09/2025    LDLCALC 60 12/11/2024    LDLCALC 49 06/18/2024     Lab Results   Component Value Date    TRIG 34 07/09/2025    TRIG 46 12/11/2024    TRIG 41 06/18/2024     No components found for: \"CHOLHDL\"  Lab Results   Component Value Date    HGBA1C 5.0 07/09/2025    HGBA1C 4.6 12/11/2024    HGBA1C 4.5 06/18/2024     Lab Results   Component Value Date    GLUF 84 02/23/2022    LDLCALC 48 07/09/2025    CREATININE 0.77 07/09/2025        Cardiology Tests:    Echo:  No results found for this or any previous visit from the past 1095 days.    Stress Test:  Stress Test 11/6/2023    Cardiac Imaging:  CT angio coronary art with heartflow if score >30% 11/1/2023        Education: Reviewed ‘ABCs’ of diabetes management (respective goals in parentheses):  A1C (<6), blood pressure (<130/80), and cholesterol (LDL <70).  Compliance at present is estimated to be excellent.   Follow up: 12 months      Abdullahi Ca MD, FACC, MEGHAN.  Chief, Cardiovascular Medicine  [facility] [place], [facility]  Chief Academic and Scientific Officer  [facility] Professor of Medicine  Professor of Medicine, Radiology and Biomedical Engineering  [employer]  [place], [place]      "

## 2025-07-11 ENCOUNTER — OFFICE VISIT (OUTPATIENT)
Dept: CARDIOLOGY | Facility: HOSPITAL | Age: 60
End: 2025-07-11
Payer: COMMERCIAL

## 2025-07-11 VITALS
SYSTOLIC BLOOD PRESSURE: 111 MMHG | WEIGHT: 232.1 LBS | BODY MASS INDEX: 31.44 KG/M2 | RESPIRATION RATE: 18 BRPM | OXYGEN SATURATION: 98 % | DIASTOLIC BLOOD PRESSURE: 71 MMHG | HEIGHT: 72 IN | HEART RATE: 59 BPM

## 2025-07-11 DIAGNOSIS — E78.2 MIXED HYPERLIPIDEMIA: ICD-10-CM

## 2025-07-11 DIAGNOSIS — I10 PRIMARY HYPERTENSION: Primary | ICD-10-CM

## 2025-07-11 DIAGNOSIS — E66.811 CLASS 1 OBESITY: ICD-10-CM

## 2025-07-11 PROCEDURE — 99215 OFFICE O/P EST HI 40 MIN: CPT | Performed by: INTERNAL MEDICINE

## 2025-07-11 PROCEDURE — 99213 OFFICE O/P EST LOW 20 MIN: CPT

## 2025-07-11 PROCEDURE — 3078F DIAST BP <80 MM HG: CPT | Performed by: INTERNAL MEDICINE

## 2025-07-11 PROCEDURE — 3008F BODY MASS INDEX DOCD: CPT | Performed by: INTERNAL MEDICINE

## 2025-07-11 PROCEDURE — 3074F SYST BP LT 130 MM HG: CPT | Performed by: INTERNAL MEDICINE

## 2025-07-11 ASSESSMENT — PAIN SCALES - GENERAL: PAINLEVEL_OUTOF10: 0-NO PAIN

## 2025-07-11 NOTE — PATIENT INSTRUCTIONS
VISIT SUMMARY:  Today, you came in for a follow-up on your weight management and cardiovascular health. You have been actively engaging in a structured exercise regimen and have made significant dietary changes, resulting in notable weight loss. You are currently taking medications for weight management and blood pressure control. We discussed your progress, current treatment plan, and future steps to maintain and improve your health.    YOUR PLAN:  -MILD CORONARY ARTERY DISEASE: Mild coronary artery disease means there is some plaque buildup in your coronary arteries, which can affect blood flow to your heart. Your significant weight loss and exercise regimen have likely improved your cardiovascular health. Continue with your current exercise and dietary plan, and keep taking Mounjaro 15 mg subcutaneous once weekly. We will monitor your cardiovascular health with regular follow-ups and have ordered an echocardiogram to check for any changes in your heart function. We will reassess your GLP-1 therapy in 2-3 years or if significant metabolic adaptation is achieved.    -HYPERTENSION: Hypertension means high blood pressure. Your blood pressure is well-controlled with your current medication regimen. Continue taking olmesartan 20 mg daily and monitor your blood pressure at home regularly. Ensure you stay adequately hydrated.    INSTRUCTIONS:  Please continue with your current exercise regimen and dietary modifications. Take Mounjaro 15 mg subcutaneous once weekly and olmesartan 20 mg daily. Monitor your blood pressure at home regularly, especially if it is consistently less than 130/80 mmHg. Ensure you stay adequately hydrated. We have ordered an echocardiogram to assess for any changes in your heart function. We will reassess your GLP-1 therapy in 2-3 years or if significant metabolic adaptation is achieved. Follow up with regular cardiovascular health check-ups.     There are several ways to reduce your risk for heart  disease and stroke through lifestyle measures.  These include changes to your diet to reduce salt, increase consumption of fruits and vegetables, choose whole grains such as whole-wheat, choose low fat dairy products and avoid saturated and transfats, reduce sugar intake and avoid snacks and sweets, and choose lean meats over high cholesterol fatty meat.  It is also recommended to increase physical activity such as brisk walking, jogging, swimming, or bicycling for at least 150 minutes/week.  This can be divided up over 30-minute periods 5 times per week.  It is also recommended that you try to get 8 hours of sleep per night.

## 2025-07-14 LAB
ALBUMIN SERPL-MCNC: 4.4 G/DL (ref 3.6–5.1)
ALBUMIN/CREAT UR: 3 MG/G CREAT
ALP SERPL-CCNC: 63 U/L (ref 35–144)
ALT SERPL-CCNC: 21 U/L (ref 9–46)
ANION GAP SERPL CALCULATED.4IONS-SCNC: 6 MMOL/L (CALC) (ref 7–17)
AST SERPL-CCNC: 18 U/L (ref 10–35)
BASOPHILS # BLD AUTO: 39 CELLS/UL (ref 0–200)
BASOPHILS NFR BLD AUTO: 1 %
BILIRUB SERPL-MCNC: 0.7 MG/DL (ref 0.2–1.2)
BUN SERPL-MCNC: 21 MG/DL (ref 7–25)
CALCIUM SERPL-MCNC: 8.9 MG/DL (ref 8.6–10.3)
CHLORIDE SERPL-SCNC: 107 MMOL/L (ref 98–110)
CHOLEST SERPL-MCNC: 118 MG/DL
CHOLEST/HDLC SERPL: 2 (CALC)
CO2 SERPL-SCNC: 28 MMOL/L (ref 20–32)
CREAT SERPL-MCNC: 0.77 MG/DL (ref 0.7–1.35)
CREAT UR-MCNC: 238 MG/DL (ref 20–320)
EGFRCR SERPLBLD CKD-EPI 2021: 102 ML/MIN/1.73M2
EOSINOPHIL # BLD AUTO: 222 CELLS/UL (ref 15–500)
EOSINOPHIL NFR BLD AUTO: 5.7 %
ERYTHROCYTE [DISTWIDTH] IN BLOOD BY AUTOMATED COUNT: 13.5 % (ref 11–15)
EST. AVERAGE GLUCOSE BLD GHB EST-MCNC: 97 MG/DL
EST. AVERAGE GLUCOSE BLD GHB EST-SCNC: 5.4 MMOL/L
GLUCOSE SERPL-MCNC: 82 MG/DL (ref 65–99)
HBA1C MFR BLD: 5 %
HCT VFR BLD AUTO: 43.4 % (ref 38.5–50)
HDL SERPL QN: 9.5 NM
HDL SERPL-SCNC: 37.7 UMOL/L
HDLC SERPL-MCNC: 60 MG/DL
HGB BLD-MCNC: 13.9 G/DL (ref 13.2–17.1)
HLD.LARGE SERPL-SCNC: 10.4 UMOL/L
LDL SERPL QN: 20.7 NM
LDL SERPL-SCNC: 805 NMOL/L
LDL SMALL SERPL-SCNC: 298 NMOL/L
LDLC SERPL CALC-MCNC: 48 MG/DL (CALC)
LYMPHOCYTES # BLD AUTO: 1248 CELLS/UL (ref 850–3900)
LYMPHOCYTES NFR BLD AUTO: 32 %
MCH RBC QN AUTO: 30.8 PG (ref 27–33)
MCHC RBC AUTO-ENTMCNC: 32 G/DL (ref 32–36)
MCV RBC AUTO: 96.2 FL (ref 80–100)
MICROALBUMIN UR-MCNC: 0.6 MG/DL
MONOCYTES # BLD AUTO: 421 CELLS/UL (ref 200–950)
MONOCYTES NFR BLD AUTO: 10.8 %
NEUTROPHILS # BLD AUTO: 1970 CELLS/UL (ref 1500–7800)
NEUTROPHILS NFR BLD AUTO: 50.5 %
NONHDLC SERPL-MCNC: 58 MG/DL (CALC)
PLATELET # BLD AUTO: 159 THOUSAND/UL (ref 140–400)
PMV BLD REES-ECKER: 11.7 FL (ref 7.5–12.5)
POTASSIUM SERPL-SCNC: 4.7 MMOL/L (ref 3.5–5.3)
PROT SERPL-MCNC: 6.8 G/DL (ref 6.1–8.1)
RBC # BLD AUTO: 4.51 MILLION/UL (ref 4.2–5.8)
SODIUM SERPL-SCNC: 141 MMOL/L (ref 135–146)
TRIGL SERPL-MCNC: 34 MG/DL
VLDL LARGE SERPL-SCNC: <1.5 NMOL/L
VLDL SERPL QN: 46.9 NM
WBC # BLD AUTO: 3.9 THOUSAND/UL (ref 3.8–10.8)

## 2025-07-15 PROCEDURE — RXMED WILLOW AMBULATORY MEDICATION CHARGE

## 2025-07-16 ENCOUNTER — PHARMACY VISIT (OUTPATIENT)
Dept: PHARMACY | Facility: CLINIC | Age: 60
End: 2025-07-16
Payer: MEDICARE

## 2025-07-30 NOTE — PROGRESS NOTES
PLASTIC SURGERY CLINIC NOTE  BODY CONTOURING    Date: 8/5/25     HPI  German Oseguera is a 59 y.o. male with a past medical history as listed below, who presents for evaluation for body contouring. The patient has lost ***lbs overall by lifestyle modifications and the use of Tirzepatide. His weight has been stable for at least the past 6 months. *** with a current BMI of 31.48. Patient complains that due to the excess skin, he following symptoms:    Symptom    Rashes/intertrigo in skin folds {YES / NO:69778}   Pain associated with rashes/intertrigo/skin breakdown in skin folds {YES / NO:24387}   Excess skin causing limitations in physical activity {YES / NO:80391}   Inability to fit properly in clothes {YES / NO:96356}   Chaffing of skin due to clothing improperly fitting {YES / NO:81266}     In addition to the above symptoms, the patient has undergone the following conservative measures with no relief:     Treatment     Medically supervised weight loss program for at least 3 months {YES / NO:26153}   Dietary modifications and aerobic exercise for at least 6 months  {YES / NO:90695}   Used dermatologic therapy for ulcers or refractory skin infections  {YES / NO:03111}   Use of antibiotics for recurrent panniculitis {YES / NO:55305}   Used NSAIDs for pain relief  {YES / NO:51700}   Participated in physical therapy {YES / NO:20270}       Histories  Medical History[1]  Surgical History[2]    Family History[3]  Of note, patient does *** not have a family history of clotting disorders.     Social History[4]      Review of Systems  Constitutional: negative  Eyes: negative  Ears: negative  Nose/Sinuses: negative  Mouth/Throat: negative  Cardiovascular:  negative  Respiratory:  negative  Gastrointestinal:  negative  Genitourinary:  negative  Musculoskeletal: {+/-:20260}back pain, ***  Integumentary:  {+/-:20260}Intertrigo, {+/-:20260}ulceration  Neuro:  negative  Psych:  negative  Endocrine:  negative  Hem/Lymph:   negative  Allergy/Immunology:  negative    Physical Exam  There were no vitals taken for this visit.     General: alert, oriented times three, no apparent distress, appearing age appropriate  Skin: excess skin in areas of ***, active *** intertrigo in the following areas: ***, ***hyperpigmentation indicative of chronic intertrigo; no *** palpable hernias, palpable*** rectus diastasis   Pannus hangs at***/below*** the level of the pubic symphysis, pannus overhang measurements:    Central: ***cm    Left: ***cm    Right: ***cm  Head: normocephalic, no masses, lesions, tenderness or abnormalities  Eyes: anicteric sclera, pupils are equally round and reactive to light, extraocular movements are intact  Neck: neck supple, no adenopathy, normal size  Neuro: unremarkable without focal findings  Extremities/Musculoskeletal: no cyanosis, no edema, intact times four   Excess skin hanging of the arms*** with *** cm overhang on the right and *** cm overhang on the left   Excess skin hanging of the thighs ***   Evidence of chaffing of the arms/legs*** due to clothing     Laboratory  [unfilled]    Prealbumin ***    Radiology  No new Radiology***    Procedure Note  Not applicable.    Capelizabethi Score:   [unfilled]    Assessment/Plan  German Oseguera is a 59 y.o. male with symptomatic excess skin in the areas of the *** pannus and *** who has failed conservative management after ***at least 6 months of non-operative therapeutic measures.  There is a reasonable likelihood that his/her*** symptoms are primarily due to excess skin and it is medically necessary to perform an/a*** abdominoplasty/panniculectomy/belt lipectomy/thighplasty/brachioplasty*** which will likely result in improvement of chronic pain and symptoms.    {If a concurrent belt lipectomy would be performed with an abdominoplasty/panniculectomy***, this would decrease the incidence of erythema intertrigo by eliminating the back roll. It would also decrease the  possibility of revision surgery(ies) when compared to abdominoplasty/panniculectomy*** alone.}       - Risks/benefits and complications including but not limited to: pain, bleeding, infection, scarring, wound breakdown, poor cosmesis, need for repeat/additional procedures, damage to adjacent/surrounding structures, hematoma, seroma, failure to improve symptoms, umbilicus necrosis, as well as alternatives to procedure were discussed with the patient who verbalized understanding.   - Educated on procedure in detail including location of scars and anticipated postoperative recovery timeframe  - Encouraged to obtain supportive compression garment for postoperative care  - All questions were answered to patient satisfaction  - Instructional packet provided for self-education  - Pre-D for *** abdominoplasty/panniculectomy/FDL/brachioplasty/thighplasty submitted  - There is no height or weight on file to calculate BMI., plication of the rectus diastasis would not*** be planned (no plication recommended with BMI > 35)  - RTC upon insurance approval  - Patient's Caprini score is: ***   Patient will require 10 days *** 30 days of anticoagulation therapy   Patient will be prescribed ***    VTE Prophylaxis Protocol:   The following drugs can be prescribed based off of the Caprini ROSETTA for either 10 days (score 3-4) or 30 days (score >= 5):    Lovenox (brand name) aka enoxaparin (generic name)    Route of administration: subcutaneous injection    40 mg daily or if BMI > 40, 40 mg BID   Xarelto (brand name) aka rivaroxaban (generic name)     Route of administration: oral 10 mg daily   Eliquis (brand name) aka apixaban (generic name)     Route of administration: oral 2.5 mg BID      Photos taken by ***        [1]   Past Medical History:  Diagnosis Date    Acute upper respiratory infection, unspecified 03/26/2019    Acute URI    Nontoxic single thyroid nodule 09/07/2017    Left thyroid nodule    Pain in left foot 01/03/2017    Left  foot pain    Personal history of other diseases of the musculoskeletal system and connective tissue 08/01/2013    History of low back pain    Personal history of other diseases of the respiratory system 08/24/2015    History of acute bronchitis    Right upper quadrant abdominal tenderness 08/26/2016    Right upper quadrant abdominal tenderness    Ventricular premature depolarization 03/16/2016    PVC's (premature ventricular contractions)   [2]   Past Surgical History:  Procedure Laterality Date    ABLATION OF DYSRHYTHMIC FOCUS  09/16/2017    Catheter Ablation    CT ANGIO CORONARY ART WITH HEARTFLOW IF SCORE >30%  11/1/2023    CT ANGIO CORONARY ART WITH HEARTFLOW IF SCORE >30% 11/1/2023 [facility] CT    KNEE ARTHROSCOPY W/ DEBRIDEMENT  09/24/2013    Knee Arthroscopy (Therapeutic)    OTHER SURGICAL HISTORY  06/21/2020    Colonoscopy    REFRACTIVE SURGERY  08/29/2017    Corneal LASIK    VASECTOMY  09/24/2013    Surgery Vas Deferens Vasectomy   [3]   Family History  Problem Relation Name Age of Onset    Hypothyroidism Mother      Coronary artery disease Father      Hypertension Father      Thyroid disease Other Cousin     No Known Problems Sibling     [4]   Social History  Socioeconomic History    Marital status:    Tobacco Use    Smoking status: Never    Smokeless tobacco: Never   Vaping Use    Vaping status: Never Used   Substance and Sexual Activity    Alcohol use: Yes     Alcohol/week: 1.0 standard drink of alcohol     Types: 1 Glasses of wine per week    Drug use: Never

## 2025-08-05 ENCOUNTER — APPOINTMENT (OUTPATIENT)
Dept: PLASTIC SURGERY | Facility: CLINIC | Age: 60
End: 2025-08-05

## 2025-08-08 PROCEDURE — RXMED WILLOW AMBULATORY MEDICATION CHARGE

## 2025-08-11 ENCOUNTER — PHARMACY VISIT (OUTPATIENT)
Dept: PHARMACY | Facility: CLINIC | Age: 60
End: 2025-08-11
Payer: MEDICARE

## 2025-08-12 ENCOUNTER — APPOINTMENT (OUTPATIENT)
Dept: PLASTIC SURGERY | Facility: CLINIC | Age: 60
End: 2025-08-12

## 2025-08-12 VITALS
SYSTOLIC BLOOD PRESSURE: 89 MMHG | BODY MASS INDEX: 31.18 KG/M2 | HEIGHT: 72 IN | DIASTOLIC BLOOD PRESSURE: 50 MMHG | WEIGHT: 230.2 LBS | HEART RATE: 57 BPM

## 2025-08-12 DIAGNOSIS — L98.7 EXCESS SKIN OF ABDOMEN: Primary | ICD-10-CM

## 2025-08-12 DIAGNOSIS — R63.4 WEIGHT LOSS: ICD-10-CM

## 2025-08-12 PROCEDURE — 99204 OFFICE O/P NEW MOD 45 MIN: CPT | Performed by: SURGERY

## 2025-08-12 PROCEDURE — 3074F SYST BP LT 130 MM HG: CPT | Performed by: SURGERY

## 2025-08-12 PROCEDURE — 3008F BODY MASS INDEX DOCD: CPT | Performed by: SURGERY

## 2025-08-12 PROCEDURE — 1036F TOBACCO NON-USER: CPT | Performed by: SURGERY

## 2025-08-12 PROCEDURE — 3078F DIAST BP <80 MM HG: CPT | Performed by: SURGERY

## 2025-08-15 PROBLEM — L98.7 EXCESS SKIN OF ABDOMEN: Status: ACTIVE | Noted: 2025-08-12

## 2025-08-15 RX ORDER — APREPITANT 40 MG/1
40 CAPSULE ORAL ONCE
OUTPATIENT
Start: 2025-08-15 | End: 2025-08-15

## 2025-08-15 RX ORDER — ACETAMINOPHEN 325 MG/1
975 TABLET ORAL ONCE
OUTPATIENT
Start: 2025-08-15 | End: 2025-08-15

## 2025-08-15 RX ORDER — CEFAZOLIN SODIUM 2 G/100ML
2 INJECTION, SOLUTION INTRAVENOUS ONCE
OUTPATIENT
Start: 2025-08-15 | End: 2025-08-15

## 2025-08-15 RX ORDER — ACETAMINOPHEN 325 MG/1
650 TABLET ORAL ONCE
OUTPATIENT
Start: 2025-08-15 | End: 2025-08-15

## 2025-09-04 DIAGNOSIS — N40.1 BENIGN PROSTATIC HYPERPLASIA WITH URINARY FREQUENCY: ICD-10-CM

## 2025-09-04 DIAGNOSIS — R35.0 BENIGN PROSTATIC HYPERPLASIA WITH URINARY FREQUENCY: ICD-10-CM

## 2025-09-04 RX ORDER — TADALAFIL 5 MG/1
5 TABLET ORAL DAILY PRN
Qty: 90 TABLET | Refills: 0 | Status: SHIPPED | OUTPATIENT
Start: 2025-09-04 | End: 2026-09-04

## 2025-10-28 ENCOUNTER — APPOINTMENT (OUTPATIENT)
Dept: PLASTIC SURGERY | Facility: CLINIC | Age: 60
End: 2025-10-28
Payer: COMMERCIAL

## 2025-12-16 ENCOUNTER — APPOINTMENT (OUTPATIENT)
Dept: PHARMACY | Facility: HOSPITAL | Age: 60
End: 2025-12-16
Payer: COMMERCIAL